# Patient Record
Sex: MALE | Race: WHITE | NOT HISPANIC OR LATINO | Employment: OTHER | URBAN - METROPOLITAN AREA
[De-identification: names, ages, dates, MRNs, and addresses within clinical notes are randomized per-mention and may not be internally consistent; named-entity substitution may affect disease eponyms.]

---

## 2023-04-18 LAB
ALANINE AMINOTRANSFERASE (SGPT) (U/L) IN SER/PLAS: 13 U/L (ref 10–52)
ALBUMIN (G/DL) IN SER/PLAS: 4 G/DL (ref 3.4–5)
ALKALINE PHOSPHATASE (U/L) IN SER/PLAS: 48 U/L (ref 33–136)
ANION GAP IN SER/PLAS: 12 MMOL/L (ref 10–20)
ASPARTATE AMINOTRANSFERASE (SGOT) (U/L) IN SER/PLAS: 17 U/L (ref 9–39)
BILIRUBIN TOTAL (MG/DL) IN SER/PLAS: 0.8 MG/DL (ref 0–1.2)
CALCIUM (MG/DL) IN SER/PLAS: 9.3 MG/DL (ref 8.6–10.3)
CARBON DIOXIDE, TOTAL (MMOL/L) IN SER/PLAS: 28 MMOL/L (ref 21–32)
CHLORIDE (MMOL/L) IN SER/PLAS: 106 MMOL/L (ref 98–107)
CHOLESTEROL (MG/DL) IN SER/PLAS: 111 MG/DL (ref 0–199)
CHOLESTEROL IN HDL (MG/DL) IN SER/PLAS: 45.3 MG/DL
CHOLESTEROL/HDL RATIO: 2.5
CREATININE (MG/DL) IN SER/PLAS: 1.12 MG/DL (ref 0.5–1.3)
ERYTHROCYTE DISTRIBUTION WIDTH (RATIO) BY AUTOMATED COUNT: 12.5 % (ref 11.5–14.5)
ERYTHROCYTE MEAN CORPUSCULAR HEMOGLOBIN CONCENTRATION (G/DL) BY AUTOMATED: 32.1 G/DL (ref 32–36)
ERYTHROCYTE MEAN CORPUSCULAR VOLUME (FL) BY AUTOMATED COUNT: 94 FL (ref 80–100)
ERYTHROCYTES (10*6/UL) IN BLOOD BY AUTOMATED COUNT: 5.11 X10E12/L (ref 4.5–5.9)
GFR MALE: 67 ML/MIN/1.73M2
GLUCOSE (MG/DL) IN SER/PLAS: 100 MG/DL (ref 74–99)
HEMATOCRIT (%) IN BLOOD BY AUTOMATED COUNT: 48 % (ref 41–52)
HEMOGLOBIN (G/DL) IN BLOOD: 15.4 G/DL (ref 13.5–17.5)
LDL: 51 MG/DL (ref 0–99)
LEUKOCYTES (10*3/UL) IN BLOOD BY AUTOMATED COUNT: 6.4 X10E9/L (ref 4.4–11.3)
MAGNESIUM (MG/DL) IN SER/PLAS: 2.33 MG/DL (ref 1.6–2.4)
PLATELETS (10*3/UL) IN BLOOD AUTOMATED COUNT: 216 X10E9/L (ref 150–450)
POTASSIUM (MMOL/L) IN SER/PLAS: 4.8 MMOL/L (ref 3.5–5.3)
PROTEIN TOTAL: 6.6 G/DL (ref 6.4–8.2)
SODIUM (MMOL/L) IN SER/PLAS: 141 MMOL/L (ref 136–145)
TRIGLYCERIDE (MG/DL) IN SER/PLAS: 76 MG/DL (ref 0–149)
UREA NITROGEN (MG/DL) IN SER/PLAS: 29 MG/DL (ref 6–23)
VLDL: 15 MG/DL (ref 0–40)

## 2023-10-30 ENCOUNTER — LAB (OUTPATIENT)
Dept: LAB | Facility: LAB | Age: 78
End: 2023-10-30
Payer: MEDICARE

## 2023-10-30 DIAGNOSIS — I10 ESSENTIAL (PRIMARY) HYPERTENSION: ICD-10-CM

## 2023-10-30 DIAGNOSIS — R00.1 BRADYCARDIA, UNSPECIFIED: ICD-10-CM

## 2023-10-30 DIAGNOSIS — D64.9 ANEMIA, UNSPECIFIED: ICD-10-CM

## 2023-10-30 DIAGNOSIS — E66.9 OBESITY, UNSPECIFIED: ICD-10-CM

## 2023-10-30 DIAGNOSIS — E78.5 HYPERLIPIDEMIA, UNSPECIFIED: ICD-10-CM

## 2023-10-30 DIAGNOSIS — R55 SYNCOPE AND COLLAPSE: ICD-10-CM

## 2023-10-30 DIAGNOSIS — I35.0 NONRHEUMATIC AORTIC (VALVE) STENOSIS: ICD-10-CM

## 2023-10-30 DIAGNOSIS — I25.10 ATHEROSCLEROTIC HEART DISEASE OF NATIVE CORONARY ARTERY WITHOUT ANGINA PECTORIS: ICD-10-CM

## 2023-10-30 DIAGNOSIS — E78.5 HYPERLIPIDEMIA, UNSPECIFIED: Primary | ICD-10-CM

## 2023-10-30 LAB
ANION GAP SERPL CALC-SCNC: 12 MMOL/L (ref 10–20)
BUN SERPL-MCNC: 30 MG/DL (ref 6–23)
CALCIUM SERPL-MCNC: 9.1 MG/DL (ref 8.6–10.3)
CHLORIDE SERPL-SCNC: 105 MMOL/L (ref 98–107)
CO2 SERPL-SCNC: 25 MMOL/L (ref 21–32)
CREAT SERPL-MCNC: 1.06 MG/DL (ref 0.5–1.3)
GFR SERPL CREATININE-BSD FRML MDRD: 71 ML/MIN/1.73M*2
GLUCOSE SERPL-MCNC: 93 MG/DL (ref 74–99)
MAGNESIUM SERPL-MCNC: 2.27 MG/DL (ref 1.6–2.4)
POTASSIUM SERPL-SCNC: 4.3 MMOL/L (ref 3.5–5.3)
SODIUM SERPL-SCNC: 138 MMOL/L (ref 136–145)

## 2023-10-30 PROCEDURE — 83735 ASSAY OF MAGNESIUM: CPT

## 2023-10-30 PROCEDURE — 36415 COLL VENOUS BLD VENIPUNCTURE: CPT

## 2023-10-30 PROCEDURE — 80048 BASIC METABOLIC PNL TOTAL CA: CPT

## 2023-11-06 DIAGNOSIS — E78.5 HYPERLIPIDEMIA, UNSPECIFIED HYPERLIPIDEMIA TYPE: Primary | ICD-10-CM

## 2023-11-08 RX ORDER — ROSUVASTATIN CALCIUM 5 MG/1
TABLET, COATED ORAL
COMMUNITY
Start: 2023-08-06 | End: 2023-11-08 | Stop reason: SDUPTHER

## 2023-11-08 RX ORDER — LISINOPRIL AND HYDROCHLOROTHIAZIDE 20; 25 MG/1; MG/1
1 TABLET ORAL DAILY
Qty: 90 TABLET | Refills: 3 | OUTPATIENT
Start: 2023-11-08

## 2023-11-09 RX ORDER — ROSUVASTATIN CALCIUM 5 MG/1
TABLET, COATED ORAL
Qty: 90 TABLET | Refills: 3 | Status: SHIPPED | OUTPATIENT
Start: 2023-11-09 | End: 2023-11-16 | Stop reason: SDUPTHER

## 2023-11-14 RX ORDER — ROSUVASTATIN CALCIUM 5 MG/1
TABLET, COATED ORAL
COMMUNITY
Start: 2022-11-18 | End: 2023-11-16 | Stop reason: WASHOUT

## 2023-11-14 RX ORDER — LISINOPRIL AND HYDROCHLOROTHIAZIDE 20; 25 MG/1; MG/1
TABLET ORAL
COMMUNITY
Start: 2023-08-06 | End: 2023-11-16 | Stop reason: SDUPTHER

## 2023-11-14 RX ORDER — VIT C/E/ZN/COPPR/LUTEIN/ZEAXAN 250MG-90MG
CAPSULE ORAL
COMMUNITY
Start: 2016-08-15

## 2023-11-14 RX ORDER — ACETAMINOPHEN, DEXTROMETHORPHAN HBR, DOXYLAMINE SUCCINATE, PHENYLEPHRINE HCL 650; 20; 12.5; 1 MG/30ML; MG/30ML; MG/30ML; MG/30ML
1 SOLUTION ORAL DAILY
COMMUNITY
Start: 2022-05-16

## 2023-11-14 RX ORDER — TIMOLOL MALEATE 5 MG/ML
SOLUTION/ DROPS OPHTHALMIC
COMMUNITY
Start: 2016-08-15

## 2023-11-14 RX ORDER — LISINOPRIL AND HYDROCHLOROTHIAZIDE 10; 12.5 MG/1; MG/1
1 TABLET ORAL DAILY
COMMUNITY
Start: 2016-08-15 | End: 2023-11-16 | Stop reason: WASHOUT

## 2023-11-14 RX ORDER — ASPIRIN 81 MG/1
1 TABLET ORAL DAILY
COMMUNITY
Start: 2022-05-16

## 2023-11-14 RX ORDER — FERROUS SULFATE 325(65) MG
1 TABLET ORAL DAILY
COMMUNITY
Start: 2022-05-16

## 2023-11-15 PROBLEM — E66.9 OBESITY: Status: ACTIVE | Noted: 2023-11-15

## 2023-11-15 PROBLEM — R60.0 LOWER EXTREMITY EDEMA: Status: ACTIVE | Noted: 2023-11-15

## 2023-11-15 PROBLEM — R55 SYNCOPE AND COLLAPSE: Status: ACTIVE | Noted: 2023-11-15

## 2023-11-15 PROBLEM — E11.9 DIABETES MELLITUS (MULTI): Status: ACTIVE | Noted: 2023-11-15

## 2023-11-15 PROBLEM — I10 HYPERTENSION: Status: ACTIVE | Noted: 2023-11-15

## 2023-11-15 PROBLEM — E78.5 HYPERLIPIDEMIA: Status: ACTIVE | Noted: 2023-11-15

## 2023-11-15 PROBLEM — R09.89 BRUIT: Status: ACTIVE | Noted: 2023-11-15

## 2023-11-15 PROBLEM — E55.9 VITAMIN D DEFICIENCY: Status: ACTIVE | Noted: 2023-11-15

## 2023-11-15 PROBLEM — I25.10 ASHD (ARTERIOSCLEROTIC HEART DISEASE): Status: ACTIVE | Noted: 2023-11-15

## 2023-11-15 PROBLEM — M79.673 CHRONIC HEEL PAIN: Status: ACTIVE | Noted: 2023-11-15

## 2023-11-15 PROBLEM — G89.29 CHRONIC HEEL PAIN: Status: ACTIVE | Noted: 2023-11-15

## 2023-11-15 PROBLEM — R01.1 SYSTOLIC MURMUR: Status: ACTIVE | Noted: 2023-11-15

## 2023-11-15 PROBLEM — R53.83 FATIGUE: Status: ACTIVE | Noted: 2023-11-15

## 2023-11-15 PROBLEM — K21.9 GERD (GASTROESOPHAGEAL REFLUX DISEASE): Status: ACTIVE | Noted: 2023-11-15

## 2023-11-15 PROBLEM — I35.0 AORTIC VALVE STENOSIS: Status: ACTIVE | Noted: 2023-11-15

## 2023-11-15 PROBLEM — U07.1 COVID-19: Status: ACTIVE | Noted: 2023-11-15

## 2023-11-15 PROBLEM — D64.9 ANEMIA: Status: ACTIVE | Noted: 2023-11-15

## 2023-11-15 PROBLEM — R00.1 BRADYCARDIA: Status: ACTIVE | Noted: 2023-11-15

## 2023-11-16 ENCOUNTER — OFFICE VISIT (OUTPATIENT)
Dept: CARDIOLOGY | Facility: CLINIC | Age: 78
End: 2023-11-16
Payer: MEDICARE

## 2023-11-16 VITALS
WEIGHT: 193 LBS | SYSTOLIC BLOOD PRESSURE: 120 MMHG | BODY MASS INDEX: 29.78 KG/M2 | DIASTOLIC BLOOD PRESSURE: 64 MMHG | HEART RATE: 60 BPM

## 2023-11-16 DIAGNOSIS — E78.5 HYPERLIPIDEMIA, UNSPECIFIED HYPERLIPIDEMIA TYPE: ICD-10-CM

## 2023-11-16 DIAGNOSIS — E78.2 MIXED HYPERLIPIDEMIA: ICD-10-CM

## 2023-11-16 DIAGNOSIS — I35.0 NONRHEUMATIC AORTIC VALVE STENOSIS: ICD-10-CM

## 2023-11-16 DIAGNOSIS — I10 PRIMARY HYPERTENSION: ICD-10-CM

## 2023-11-16 DIAGNOSIS — R00.1 BRADYCARDIA: Primary | ICD-10-CM

## 2023-11-16 DIAGNOSIS — R55 SYNCOPE AND COLLAPSE: ICD-10-CM

## 2023-11-16 DIAGNOSIS — I25.10 ASHD (ARTERIOSCLEROTIC HEART DISEASE): ICD-10-CM

## 2023-11-16 PROBLEM — R69 DISORDER: Status: ACTIVE | Noted: 2022-05-04

## 2023-11-16 PROBLEM — Z82.49 FAMILY HISTORY OF ISCHEMIC HEART DISEASE: Status: ACTIVE | Noted: 2022-04-21

## 2023-11-16 PROBLEM — U07.1 DISEASE DUE TO SEVERE ACUTE RESPIRATORY SYNDROME CORONAVIRUS 2 (SARS-COV-2): Status: ACTIVE | Noted: 2023-11-16

## 2023-11-16 PROBLEM — K21.9 GASTROESOPHAGEAL REFLUX DISEASE: Status: ACTIVE | Noted: 2022-05-04

## 2023-11-16 PROBLEM — R94.31 ABNORMAL ELECTROCARDIOGRAPHY: Status: ACTIVE | Noted: 2022-04-21

## 2023-11-16 PROBLEM — Z79.02 LONG TERM (CURRENT) USE OF ANTITHROMBOTICS/ANTIPLATELETS: Status: ACTIVE | Noted: 2022-05-04

## 2023-11-16 PROBLEM — D50.9 IRON DEFICIENCY ANEMIA: Status: ACTIVE | Noted: 2022-05-04

## 2023-11-16 PROBLEM — Z20.822 CONTACT WITH AND (SUSPECTED) EXPOSURE TO COVID-19: Status: ACTIVE | Noted: 2022-04-21

## 2023-11-16 PROBLEM — R60.0 EDEMA OF LOWER EXTREMITY: Status: ACTIVE | Noted: 2023-11-16

## 2023-11-16 PROBLEM — K57.30 DIVERTICULOSIS OF LARGE INTESTINE WITHOUT PERFORATION OR ABSCESS WITHOUT BLEEDING: Status: ACTIVE | Noted: 2022-05-04

## 2023-11-16 PROBLEM — Z79.82 LONG TERM (CURRENT) USE OF ASPIRIN: Status: ACTIVE | Noted: 2022-05-04

## 2023-11-16 PROBLEM — M79.673 HEEL PAIN: Status: ACTIVE | Noted: 2023-11-16

## 2023-11-16 PROCEDURE — 3074F SYST BP LT 130 MM HG: CPT | Performed by: PHYSICIAN ASSISTANT

## 2023-11-16 PROCEDURE — 1159F MED LIST DOCD IN RCRD: CPT | Performed by: PHYSICIAN ASSISTANT

## 2023-11-16 PROCEDURE — 99213 OFFICE O/P EST LOW 20 MIN: CPT | Performed by: PHYSICIAN ASSISTANT

## 2023-11-16 PROCEDURE — 3078F DIAST BP <80 MM HG: CPT | Performed by: PHYSICIAN ASSISTANT

## 2023-11-16 RX ORDER — ROSUVASTATIN CALCIUM 5 MG/1
TABLET, COATED ORAL
Qty: 90 TABLET | Refills: 3 | Status: SHIPPED | OUTPATIENT
Start: 2023-11-16 | End: 2024-03-07 | Stop reason: SDUPTHER

## 2023-11-16 RX ORDER — LISINOPRIL AND HYDROCHLOROTHIAZIDE 20; 25 MG/1; MG/1
1 TABLET ORAL DAILY
Qty: 90 TABLET | Refills: 3 | Status: SHIPPED | OUTPATIENT
Start: 2023-11-16 | End: 2024-03-07 | Stop reason: WASHOUT

## 2023-11-16 ASSESSMENT — ENCOUNTER SYMPTOMS
VOMITING: 0
PALPITATIONS: 0
ABDOMINAL PAIN: 0
NAUSEA: 0
DIARRHEA: 0
SHORTNESS OF BREATH: 0
WEAKNESS: 0
FEVER: 0
ORTHOPNEA: 0
WHEEZING: 0
DYSURIA: 0

## 2023-11-16 NOTE — PATIENT INSTRUCTIONS
Please continue your current medications.  If you have any change in your cardiorespiratory status please call the office right away.  We will arrange for your yearly visit with your cardiologist Dr. Gregory in May.

## 2023-11-16 NOTE — PROGRESS NOTES
Cardiology Follow Up  Chief Complaint:   6 month follow up     History Of Present Illness:    This is a 77-year-old male here for follow-up regarding bradycardia/syncope, coronary artery disease with known  of his RCA and collaterals from the left, mild aortic valve stenosis/regurgitation, hypertension, dyslipidemia, anemia, and obesity. The patient was last evaluated by me in November 2022. At that visit I started the patient on rosuvastatin 5 mg daily, ordered blood work including BMP/CBC/magnesium, ordered a CMP/lipid/magnesium/CBC to be drawn in 6 months, and asked the patient to follow-up in 6 months. CBC done in April 2023 showed a hemoglobin of 15.4, magnesium was 2.33, CMP showed normal serum sodium and potassium with a serum creatinine of 1.12. Lipid panel done in April 2023 showed an LDL cholesterol of 51 and triglycerides of 76 while on rosuvastatin 5 mg daily. ECG done today showed sinus rhythm with a heart rate of 62 bpm, and PACs. The patient reports that he has been feeling generally well from the cardiac standpoint. He denies any new chest pain, shortness of breath, palpitations and lightheadedness. He states that he tries to stay physically active. He states that he takes all of his medications as prescribed. During my exam, he was resting comfortably on the exam table.   11-16-23: This is a pleasant 78-year-old patient known to Dr. Gregory.  He presents for stable 6-month follow-up.  At last office visit blood pressures were elevated and his lisinopril hydrochlorothiazide dosing was increased.  Follow-up labs were unremarkable.  Patient has not had any syncope or falling.  No awareness of slower heart rates but he does note that if his systolic blood pressure drops below 110 he feels very fatigued.  He has kept an extensive graph of his blood pressure since the increased lisinopril hydrochlorothiazide and most of his blood pressures are hanging out right around 1 20-1 30 range systolic.  He has no  other cardiac symptoms such as chest pain or shortness of breath.  He is not having any peripheral edema and otherwise feels that he is coming along very well.  He has not  and currently works for the [employer].     Last Recorded Vitals:  Vitals:    11/16/23 0903   BP: 120/64   Pulse: 60   Weight: 87.5 kg (193 lb)       Past Medical History:  He has a past medical history of Personal history of other diseases of the digestive system, Unspecified abdominal hernia without obstruction or gangrene, and Vitamin D deficiency, unspecified.    Past Surgical History:  He has a past surgical history that includes Eye surgery (08/15/2016); Rotator cuff repair (08/15/2016); and Other surgical history (01/19/2020).      Social History:  He reports that he has never smoked. He does not have any smokeless tobacco history on file. No history on file for alcohol use and drug use.    Family History:  No family history on file.     Allergies:  Patient has no known allergies.    Outpatient Medications:  Current Outpatient Medications   Medication Instructions    aspirin 81 mg EC tablet 1 tablet, oral, Daily    cholecalciferol (Vitamin D-3) 25 MCG (1000 UT) capsule oral    cyanocobalamin, vitamin B-12, (Vitamin B-12) 1,000 mcg tablet extended release 1 tablet, oral, Daily    ferrous sulfate, 325 mg ferrous sulfate, tablet 1 tablet, oral, Daily    lisinopriL-hydrochlorothiazide 20-25 mg tablet 1 tablet, oral, Daily    rosuvastatin (Crestor) 5 mg tablet Take one tablet by mouth in the evening.    timolol (Timoptic) 0.5 % ophthalmic solution ophthalmic (eye)     Review of Systems   Constitutional: Negative for fever and malaise/fatigue.   Cardiovascular:  Negative for chest pain, orthopnea and palpitations.   Respiratory:  Negative for shortness of breath and wheezing.    Skin:  Negative for itching and rash.   Gastrointestinal:  Negative for abdominal pain, diarrhea, nausea and vomiting.    Genitourinary:  Negative for dysuria.   Neurological:  Negative for weakness.      Physical Exam  Constitutional:       General: He is not in acute distress.  HENT:      Mouth/Throat:      Mouth: Mucous membranes are moist.   Cardiovascular:      Rate and Rhythm: Normal rate and regular rhythm.      Heart sounds: Normal heart sounds. No murmur heard.  Abdominal:      General: Abdomen is flat. Bowel sounds are normal.      Palpations: Abdomen is soft.   Musculoskeletal:         General: No swelling.   Skin:     General: Skin is warm and dry.   Neurological:      Mental Status: He is alert and oriented to person, place, and time.   Psychiatric:         Mood and Affect: Mood normal.           Last Labs:  CBC -  Lab Results   Component Value Date    WBC 6.4 04/18/2023    HGB 15.4 04/18/2023    HCT 48.0 04/18/2023    MCV 94 04/18/2023     04/18/2023       CMP -  Lab Results   Component Value Date    CALCIUM 9.1 10/30/2023    PROT 6.6 04/18/2023    ALBUMIN 4.0 04/18/2023    AST 17 04/18/2023    ALT 13 04/18/2023    ALKPHOS 48 04/18/2023    BILITOT 0.8 04/18/2023       LIPID PANEL -   Lab Results   Component Value Date    CHOL 111 04/18/2023    TRIG 76 04/18/2023    HDL 45.3 04/18/2023    CHHDL 2.5 04/18/2023    LDLF 51 04/18/2023    VLDL 15 04/18/2023    NHDL 131 11/18/2022       RENAL FUNCTION PANEL -   Lab Results   Component Value Date    GLUCOSE 93 10/30/2023     10/30/2023    K 4.3 10/30/2023     10/30/2023    CO2 25 10/30/2023    ANIONGAP 12 10/30/2023    BUN 30 (H) 10/30/2023    CREATININE 1.06 10/30/2023    GFRMALE 67 04/18/2023    CALCIUM 9.1 10/30/2023    ALBUMIN 4.0 04/18/2023        Lab Results   Component Value Date    HGBA1C 6.5 (A) 01/04/2022       Last Cardiology Tests:  Echo: 4/22--CONCLUSIONS:   1. The left ventricular systolic function is normal with a 60-65% estimated ejection fraction.   2. The left atrium is moderately dilated.   3. Mild aortic valve stenosis.      Lab review: I  have personally reviewed the laboratory result(s).    Assessment/Plan   Problem List Items Addressed This Visit             ICD-10-CM       Cardiac and Vasculature    Aortic valve stenosis I35.0    ASHD (arteriosclerotic heart disease) I25.10    Bradycardia - Primary R00.1    Relevant Orders    Follow Up In Cardiology    Hypertension I10    Relevant Medications    lisinopriL-hydrochlorothiazide 20-25 mg tablet    Other Relevant Orders    Follow Up In Cardiology    Hyperlipidemia E78.5    Relevant Medications    rosuvastatin (Crestor) 5 mg tablet       Symptoms and Signs    Syncope and collapse R55   Hypertension--with the increased lisinopril hydrochlorothiazide blood pressures are well controlled.  Patient advised to continue to monitor his blood pressures and when he is monitoring his blood pressures to also record heart rates with his prior history of bradycardia..  Aortic stenosis--this was mild on last echocardiogram and will likely need a follow-up echo when he sees Dr. Gregory next time.  History of ASHD--no chest pain or angina.  Patient does quite a bit of bicycling and with normal bicycle activities he is not having any chest pain.  LV systolic function normal.  History of syncope--no recurrent events and again with his blood pressure log he is not having any significant episodes of hypotension.  History of hyperlipidemia--on statin therapy and lipids are very well controlled at this time.  Overall patient is doing very well from a cardiac standpoint.  His blood pressures are much better controlled with the lisinopril hydrochlorothiazide.  We will refill his medications and arrange for 6-month follow-up with Dr. Gregory.  Should the patient have any change in cardiorespiratory status he is instructed to contact the office right away.        Mickey Jackson PA-C  11/16/2023  9:29 AM

## 2024-03-07 ENCOUNTER — OFFICE VISIT (OUTPATIENT)
Dept: PRIMARY CARE | Facility: CLINIC | Age: 79
End: 2024-03-07
Payer: MEDICARE

## 2024-03-07 VITALS
RESPIRATION RATE: 16 BRPM | OXYGEN SATURATION: 98 % | BODY MASS INDEX: 29.98 KG/M2 | WEIGHT: 197.8 LBS | HEART RATE: 51 BPM | DIASTOLIC BLOOD PRESSURE: 74 MMHG | HEIGHT: 68 IN | SYSTOLIC BLOOD PRESSURE: 142 MMHG

## 2024-03-07 DIAGNOSIS — Z00.00 MEDICARE ANNUAL WELLNESS VISIT, SUBSEQUENT: Primary | ICD-10-CM

## 2024-03-07 DIAGNOSIS — E78.5 HYPERLIPIDEMIA, UNSPECIFIED HYPERLIPIDEMIA TYPE: ICD-10-CM

## 2024-03-07 DIAGNOSIS — I10 PRIMARY HYPERTENSION: ICD-10-CM

## 2024-03-07 DIAGNOSIS — E11.9 DIET-CONTROLLED DIABETES MELLITUS (MULTI): Chronic | ICD-10-CM

## 2024-03-07 PROBLEM — K21.9 GERD (GASTROESOPHAGEAL REFLUX DISEASE): Status: RESOLVED | Noted: 2023-11-15 | Resolved: 2024-03-07

## 2024-03-07 PROBLEM — K21.9 GASTROESOPHAGEAL REFLUX DISEASE: Status: RESOLVED | Noted: 2022-05-04 | Resolved: 2024-03-07

## 2024-03-07 PROCEDURE — 1159F MED LIST DOCD IN RCRD: CPT | Performed by: STUDENT IN AN ORGANIZED HEALTH CARE EDUCATION/TRAINING PROGRAM

## 2024-03-07 PROCEDURE — 1170F FXNL STATUS ASSESSED: CPT | Performed by: STUDENT IN AN ORGANIZED HEALTH CARE EDUCATION/TRAINING PROGRAM

## 2024-03-07 PROCEDURE — 1160F RVW MEDS BY RX/DR IN RCRD: CPT | Performed by: STUDENT IN AN ORGANIZED HEALTH CARE EDUCATION/TRAINING PROGRAM

## 2024-03-07 PROCEDURE — 3078F DIAST BP <80 MM HG: CPT | Performed by: STUDENT IN AN ORGANIZED HEALTH CARE EDUCATION/TRAINING PROGRAM

## 2024-03-07 PROCEDURE — 3077F SYST BP >= 140 MM HG: CPT | Performed by: STUDENT IN AN ORGANIZED HEALTH CARE EDUCATION/TRAINING PROGRAM

## 2024-03-07 PROCEDURE — 1158F ADVNC CARE PLAN TLK DOCD: CPT | Performed by: STUDENT IN AN ORGANIZED HEALTH CARE EDUCATION/TRAINING PROGRAM

## 2024-03-07 PROCEDURE — G0439 PPPS, SUBSEQ VISIT: HCPCS | Performed by: STUDENT IN AN ORGANIZED HEALTH CARE EDUCATION/TRAINING PROGRAM

## 2024-03-07 PROCEDURE — 1036F TOBACCO NON-USER: CPT | Performed by: STUDENT IN AN ORGANIZED HEALTH CARE EDUCATION/TRAINING PROGRAM

## 2024-03-07 PROCEDURE — 99214 OFFICE O/P EST MOD 30 MIN: CPT | Performed by: STUDENT IN AN ORGANIZED HEALTH CARE EDUCATION/TRAINING PROGRAM

## 2024-03-07 PROCEDURE — 1123F ACP DISCUSS/DSCN MKR DOCD: CPT | Performed by: STUDENT IN AN ORGANIZED HEALTH CARE EDUCATION/TRAINING PROGRAM

## 2024-03-07 RX ORDER — LISINOPRIL 10 MG/1
10 TABLET ORAL DAILY
Qty: 90 TABLET | Refills: 1 | Status: SHIPPED | OUTPATIENT
Start: 2024-03-07 | End: 2024-05-24 | Stop reason: SDUPTHER

## 2024-03-07 RX ORDER — ROSUVASTATIN CALCIUM 5 MG/1
TABLET, COATED ORAL
Qty: 90 TABLET | Refills: 3 | Status: SHIPPED | OUTPATIENT
Start: 2024-03-07 | End: 2024-05-24 | Stop reason: SDUPTHER

## 2024-03-07 RX ORDER — ROSUVASTATIN CALCIUM 5 MG/1
TABLET, COATED ORAL
Qty: 90 TABLET | Refills: 3 | Status: CANCELLED | OUTPATIENT
Start: 2024-03-07

## 2024-03-07 RX ORDER — LISINOPRIL AND HYDROCHLOROTHIAZIDE 20; 25 MG/1; MG/1
1 TABLET ORAL DAILY
Qty: 90 TABLET | Refills: 3 | Status: CANCELLED | OUTPATIENT
Start: 2024-03-07 | End: 2025-03-07

## 2024-03-07 ASSESSMENT — ENCOUNTER SYMPTOMS
OCCASIONAL FEELINGS OF UNSTEADINESS: 0
HEADACHES: 0
LOSS OF SENSATION IN FEET: 0
DEPRESSION: 0
FEVER: 0
FATIGUE: 0
COUGH: 0
CONFUSION: 0
SHORTNESS OF BREATH: 0
PALPITATIONS: 0

## 2024-03-07 ASSESSMENT — ANXIETY QUESTIONNAIRES
5. BEING SO RESTLESS THAT IT IS HARD TO SIT STILL: NOT AT ALL
2. NOT BEING ABLE TO STOP OR CONTROL WORRYING: NOT AT ALL
IF YOU CHECKED OFF ANY PROBLEMS ON THIS QUESTIONNAIRE, HOW DIFFICULT HAVE THESE PROBLEMS MADE IT FOR YOU TO DO YOUR WORK, TAKE CARE OF THINGS AT HOME, OR GET ALONG WITH OTHER PEOPLE: NOT DIFFICULT AT ALL
4. TROUBLE RELAXING: NOT AT ALL
1. FEELING NERVOUS, ANXIOUS, OR ON EDGE: NOT AT ALL
6. BECOMING EASILY ANNOYED OR IRRITABLE: NOT AT ALL
3. WORRYING TOO MUCH ABOUT DIFFERENT THINGS: NOT AT ALL
GAD7 TOTAL SCORE: 0
7. FEELING AFRAID AS IF SOMETHING AWFUL MIGHT HAPPEN: NOT AT ALL

## 2024-03-07 ASSESSMENT — PATIENT HEALTH QUESTIONNAIRE - PHQ9
2. FEELING DOWN, DEPRESSED OR HOPELESS: NOT AT ALL
SUM OF ALL RESPONSES TO PHQ9 QUESTIONS 1 AND 2: 0
1. LITTLE INTEREST OR PLEASURE IN DOING THINGS: NOT AT ALL

## 2024-03-07 ASSESSMENT — COLUMBIA-SUICIDE SEVERITY RATING SCALE - C-SSRS
1. IN THE PAST MONTH, HAVE YOU WISHED YOU WERE DEAD OR WISHED YOU COULD GO TO SLEEP AND NOT WAKE UP?: NO
2. HAVE YOU ACTUALLY HAD ANY THOUGHTS OF KILLING YOURSELF?: NO
6. HAVE YOU EVER DONE ANYTHING, STARTED TO DO ANYTHING, OR PREPARED TO DO ANYTHING TO END YOUR LIFE?: NO

## 2024-03-07 ASSESSMENT — ACTIVITIES OF DAILY LIVING (ADL)
DRESSING: INDEPENDENT
GROCERY_SHOPPING: INDEPENDENT
BATHING: INDEPENDENT
MANAGING_FINANCES: INDEPENDENT
TAKING_MEDICATION: INDEPENDENT
DOING_HOUSEWORK: INDEPENDENT

## 2024-03-07 NOTE — PROGRESS NOTES
Subjective   Reason for Visit: Deondre Valdez is an 78 y.o. male here for a Medicare Wellness visit.     Past Medical, Surgical, and Family History reviewed and updated in chart.    Reviewed all medications by prescribing practitioner or clinical pharmacist (such as prescriptions, OTCs, herbal therapies and supplements) and documented in the medical record.    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   Currently staying busy with [employer] and [employer]    Has been without medications for about 2 weeks  Brought in low, diastolic seems to be well controlled but systolic in 140-150 range     Patient Care Team:  Tenisha Byrd DO as PCP - General (Family Medicine)  Randy Berry MD as PCP - [employer] PCP  Janes Gregory DO as Consulting Physician (Cardiology)     Past Medical History:   Diagnosis Date    Personal history of other diseases of the digestive system     History of esophageal reflux    Unspecified abdominal hernia without obstruction or gangrene     Hernia    Vitamin D deficiency, unspecified     Vitamin D deficiency     Past Surgical History:   Procedure Laterality Date    EYE SURGERY  08/15/2016    Eye Surgery    OTHER SURGICAL HISTORY  01/19/2020    Appendectomy    ROTATOR CUFF REPAIR  08/15/2016    Rotator Cuff Repair     Family History   Problem Relation Name Age of Onset    Heart disease Father      Lung cancer Maternal Grandmother       Body mass index is 30.52 kg/m².    Tobacco/Alcohol/Opioid use, as well as Illicit Drug Use was screened for/reviewed and documented in Social History section and medication list as appropriate    Medications and Supplements  prescribed by me and other practitioners or clinical pharmacist (such as prescriptions, OTC's, herbal therapies and supplements) were reviewed and documented in the medical record.    Tobacco/Alcohol/Opioid use, as well as Illicit  "Drug Use was screened for/reviewed and documented in Social History section and medication list as appropriate    Review of Systems   Constitutional:  Negative for fatigue and fever.   Respiratory:  Negative for cough and shortness of breath.    Cardiovascular:  Negative for chest pain, palpitations and leg swelling.   Allergic/Immunologic: Negative for immunocompromised state.   Neurological:  Negative for headaches.   Psychiatric/Behavioral:  Negative for confusion.      Objective   Vitals:  /74   Pulse 51   Resp 16   Ht 1.715 m (5' 7.5\")   Wt 89.7 kg (197 lb 12.8 oz)   SpO2 98%   BMI 30.52 kg/m²       Physical Exam  Constitutional:       Appearance: Normal appearance.   HENT:      Head: Normocephalic and atraumatic.   Cardiovascular:      Rate and Rhythm: Normal rate and regular rhythm.      Pulses: Normal pulses.   Pulmonary:      Effort: Pulmonary effort is normal.   Musculoskeletal:      Comments: Trace bilateral ankle edema   Neurological:      General: No focal deficit present.      Mental Status: He is oriented to person, place, and time.   Psychiatric:         Mood and Affect: Mood normal.         Behavior: Behavior normal.     Assessment/Plan   Problem List Items Addressed This Visit       Hypertension    Current Assessment & Plan     Given WNL diastolic but slightly elevated systolic, I suspect adding back lisinopril hydrochlorothiazide 20-25 will be too strong, will start with lisinopril 10mg and titrate as needed  Goal BP less than 130/80  Work on maintaining a healthy weight, monitoring sodium intake, consistent activity and exercise  Avoid chronic use of NSAIDs  Do not use sudafed for decongestant when ill         Relevant Medications    lisinopril 10 mg tablet    Other Relevant Orders    CBC and Auto Differential    Diet-controlled diabetes mellitus (CMS/HCC) (Chronic)    Current Assessment & Plan     It has been some time since last A1c will update  Recommend annual eye exam  Is on Ace " and statin         Relevant Orders    Hemoglobin A1C    Comprehensive Metabolic Panel    Hyperlipidemia    Relevant Medications    rosuvastatin (Crestor) 5 mg tablet    Other Relevant Orders    Lipid Panel    Follow Up In [facility] - PCP - Established    Medicare annual wellness visit, subsequent - Primary    Current Assessment & Plan     PNEUMONIA vaccine- Declines  SHINGLES vaccine- Declines  INFLUENZA vaccine- Declines  Screening tests:  Colon cancer screening--> Not indicated  Prostate Cancer Screening--> Not indicated    During the course of the visit the patient was educated and counseled about age appropriate screening and preventive services. Completed preventive screenings were documented in the chart and orders were placed for outstanding screenings/procedures as documented in the Assessment and Plan.    Patient Instructions (the written plan) was given to the patient at check out that include any community based lifestyle interventions.    Other risk factors and conditions for which interventions are recommended are addressed as the other tagged diagnoses in this encounter. \

## 2024-03-07 NOTE — ASSESSMENT & PLAN NOTE
Given WNL diastolic but slightly elevated systolic, I suspect adding back lisinopril hydrochlorothiazide 20-25 will be too strong, will start with lisinopril 10mg and titrate as needed  Goal BP less than 130/80  Work on maintaining a healthy weight, monitoring sodium intake, consistent activity and exercise  Avoid chronic use of NSAIDs  Do not use sudafed for decongestant when ill

## 2024-03-07 NOTE — ASSESSMENT & PLAN NOTE
PNEUMONIA vaccine- Declines  SHINGLES vaccine- Declines  INFLUENZA vaccine- Declines  Screening tests:  Colon cancer screening--> Not indicated  Prostate Cancer Screening--> Not indicated    During the course of the visit the patient was educated and counseled about age appropriate screening and preventive services. Completed preventive screenings were documented in the chart and orders were placed for outstanding screenings/procedures as documented in the Assessment and Plan.    Patient Instructions (the written plan) was given to the patient at check out that include any community based lifestyle interventions.    Other risk factors and conditions for which interventions are recommended are addressed as the other tagged diagnoses in this encounter. \

## 2024-03-08 ENCOUNTER — LAB (OUTPATIENT)
Dept: LAB | Facility: LAB | Age: 79
End: 2024-03-08
Payer: MEDICARE

## 2024-03-08 ENCOUNTER — TELEPHONE (OUTPATIENT)
Dept: PRIMARY CARE | Facility: CLINIC | Age: 79
End: 2024-03-08

## 2024-03-08 DIAGNOSIS — E78.5 HYPERLIPIDEMIA, UNSPECIFIED HYPERLIPIDEMIA TYPE: ICD-10-CM

## 2024-03-08 DIAGNOSIS — E11.9 DIET-CONTROLLED DIABETES MELLITUS (MULTI): Chronic | ICD-10-CM

## 2024-03-08 DIAGNOSIS — I10 PRIMARY HYPERTENSION: ICD-10-CM

## 2024-03-08 LAB
ALBUMIN SERPL BCP-MCNC: 4.1 G/DL (ref 3.4–5)
ALP SERPL-CCNC: 48 U/L (ref 33–136)
ALT SERPL W P-5'-P-CCNC: 13 U/L (ref 10–52)
ANION GAP SERPL CALC-SCNC: 10 MMOL/L (ref 10–20)
AST SERPL W P-5'-P-CCNC: 18 U/L (ref 9–39)
BASOPHILS # BLD AUTO: 0.03 X10*3/UL (ref 0–0.1)
BASOPHILS NFR BLD AUTO: 0.5 %
BILIRUB SERPL-MCNC: 0.8 MG/DL (ref 0–1.2)
BUN SERPL-MCNC: 26 MG/DL (ref 6–23)
CALCIUM SERPL-MCNC: 9 MG/DL (ref 8.6–10.3)
CHLORIDE SERPL-SCNC: 109 MMOL/L (ref 98–107)
CHOLEST SERPL-MCNC: 171 MG/DL (ref 0–199)
CHOLESTEROL/HDL RATIO: 3.7
CO2 SERPL-SCNC: 25 MMOL/L (ref 21–32)
CREAT SERPL-MCNC: 1.06 MG/DL (ref 0.5–1.3)
EGFRCR SERPLBLD CKD-EPI 2021: 71 ML/MIN/1.73M*2
EOSINOPHIL # BLD AUTO: 0.26 X10*3/UL (ref 0–0.4)
EOSINOPHIL NFR BLD AUTO: 4.4 %
ERYTHROCYTE [DISTWIDTH] IN BLOOD BY AUTOMATED COUNT: 13.2 % (ref 11.5–14.5)
EST. AVERAGE GLUCOSE BLD GHB EST-MCNC: 148 MG/DL
GLUCOSE SERPL-MCNC: 91 MG/DL (ref 74–99)
HBA1C MFR BLD: 6.8 %
HCT VFR BLD AUTO: 49.3 % (ref 41–52)
HDLC SERPL-MCNC: 45.7 MG/DL
HGB BLD-MCNC: 16 G/DL (ref 13.5–17.5)
IMM GRANULOCYTES # BLD AUTO: 0.02 X10*3/UL (ref 0–0.5)
IMM GRANULOCYTES NFR BLD AUTO: 0.3 % (ref 0–0.9)
LDLC SERPL CALC-MCNC: 106 MG/DL
LYMPHOCYTES # BLD AUTO: 1.56 X10*3/UL (ref 0.8–3)
LYMPHOCYTES NFR BLD AUTO: 26.4 %
MCH RBC QN AUTO: 30.2 PG (ref 26–34)
MCHC RBC AUTO-ENTMCNC: 32.5 G/DL (ref 32–36)
MCV RBC AUTO: 93 FL (ref 80–100)
MONOCYTES # BLD AUTO: 0.67 X10*3/UL (ref 0.05–0.8)
MONOCYTES NFR BLD AUTO: 11.3 %
NEUTROPHILS # BLD AUTO: 3.38 X10*3/UL (ref 1.6–5.5)
NEUTROPHILS NFR BLD AUTO: 57.1 %
NON HDL CHOLESTEROL: 125 MG/DL (ref 0–149)
NRBC BLD-RTO: 0 /100 WBCS (ref 0–0)
PLATELET # BLD AUTO: 205 X10*3/UL (ref 150–450)
POTASSIUM SERPL-SCNC: 4.4 MMOL/L (ref 3.5–5.3)
PROT SERPL-MCNC: 6.4 G/DL (ref 6.4–8.2)
RBC # BLD AUTO: 5.29 X10*6/UL (ref 4.5–5.9)
SODIUM SERPL-SCNC: 140 MMOL/L (ref 136–145)
TRIGL SERPL-MCNC: 96 MG/DL (ref 0–149)
VLDL: 19 MG/DL (ref 0–40)
WBC # BLD AUTO: 5.9 X10*3/UL (ref 4.4–11.3)

## 2024-03-08 PROCEDURE — 36415 COLL VENOUS BLD VENIPUNCTURE: CPT

## 2024-03-08 PROCEDURE — 80053 COMPREHEN METABOLIC PANEL: CPT

## 2024-03-08 PROCEDURE — 85025 COMPLETE CBC W/AUTO DIFF WBC: CPT

## 2024-03-08 PROCEDURE — 80061 LIPID PANEL: CPT

## 2024-03-08 PROCEDURE — 83036 HEMOGLOBIN GLYCOSYLATED A1C: CPT

## 2024-03-08 NOTE — TELEPHONE ENCOUNTER
----- Message from Tenisha Byrd DO sent at 3/8/2024 11:12 AM EST -----  Lab work confirms diabetic status at ha1c of 6.8 this is progressed from last labs. I would say we can continue diet control but if A1c rises above 7 then we will need to add medication on board to help control.   His cholesterol is also elevated from last visit significantly- particularly his bad cholesterol, we are restarting his statin.    Recommend he work on increasing fiber rich foods, lean proteins like fish and poultry. Decreases complex fats like oils, butters, added sugars, red meat.     
I spoke with patient he is aware   
Clothing

## 2024-05-20 NOTE — PROGRESS NOTES
[facility]    Patient Name: Deondre Valdez  Patient : 1945      Scribe Attestation  By signing my name below, ICeci Scribe   attest that this documentation has been prepared under the direction and in the presence of Janes Gregory DO.      Reason for visit:  This is a 78-year-old male here for follow-up regarding coronary artery disease with known  of his RCA and collaterals from the left as seen on cardiac catheterization done in , mild aortic valve stenosis/regurgitation, syncope/bradycardia, hypertension, dyslipidemia, and obesity.     HPI:  This is a 78-year-old male here for follow-up regarding coronary artery disease with known  of his RCA and collaterals from the left as seen on cardiac catheterization done in , mild aortic valve stenosis/regurgitation, syncope/bradycardia, hypertension, dyslipidemia, and obesity.  The patient was last evaluated by me in May 2023.  At that visit I increased lisinopril/hydrochlorothiazide to 20/25 mg daily, ordered blood work including BMP/magnesium to be drawn in 6 months, and asked the patient to follow-up in 6 months and sooner if necessary.  The patient was subsequently seen by the cardiology PA in 2023 at which time he was doing reasonably well.  CMP done in 2024 showed normal serum sodium and potassium with a serum creatinine of 1.06, normal ALT/AST, hemoglobin A1c of 6.8%, CBC showing a hemoglobin of 16.0.  Lipid panel done in 2024 showed an LDL cholesterol 106 and triglycerides of 96 while reportedly on rosuvastatin 5 mg daily. ECG done today showed sinus bradycardia with a heart rate of 56 bpm.  The patient reports that he has been feeling generally well from the cardiac standpoint. He denies any new chest pain, shortness of breath, palpitations and lightheadedness. He brought with him his blood monitor sheet which showed elevated blood pressure readings in March and  April. He states that his blood pressure in May looked better as he was standing while his blood pressure was being taken. He reports that he has not been taking lisinopril and lisinopril/hydrochlorothiazide. He also reports that he had not been taking rosuvastatin. He states that he takes his other medications as prescribed.  During my exam, he was resting comfortably on the exam table.            Assessment/Plan:   1. Coronary artery disease  The patient has a history of coronary artery disease with known  of his RCA and collaterals from the left as seen on cardiac catheterization done in 2006.  ECG done today showed sinus bradycardia with a heart rate of 56 bpm.    He denies anginal chest discomfort.  Blood pressure appears suboptimally controlled on exam today.   He reports having elevated blood pressure readings at home. He states that his blood pressure in May appeared better as he was standing while his blood pressure was being taken.   I will restart him on lisinopril 5 mg daily.  He should continue low dose aspirin.  Echocardiogram done in April 2022 showed normal left ventricular systolic function with an ejection fraction of 60 to 65%, normal right ventricular systolic function, mild aortic valve stenosis with a mean pressure gradient of 12 mmHg and a dimensionless index of 0.53, trace to mild aortic valve regurgitation.  Recent lab works as noted in the HPI.   Lipid panel done in March 2024 showed an LDL cholesterol 106 and triglycerides of 96 while not on any lipid lowering medication.  I will restart him on rosuvastatin 5 mg daily.   Lab works as noted below will be done in 6 months prior to his next visit.  Please see lifestyle recommendations below.  Follow up in 6 months and sooner if necessary.     2. Aortic valve stenosis/regurgitation  The patient has a history of mild aortic valve stenosis/regurgitation.  Echocardiogram done in April 2022 showed normal left ventricular systolic function with  an ejection fraction of 60 to 65%, normal right ventricular systolic function, mild aortic valve stenosis with a mean pressure gradient of 12 mmHg and a dimensionless index of 0.53, trace to mild aortic valve regurgitation.  I will continue to monitor this clinically for now.    3. Bradycardia/Syncope  The patient has a history of syncope/bradycardia.  He denies having recurrence of syncopal episodes since his last visit.  ECG done today showed sinus bradycardia with a heart rate of 56 bpm.    He denies chest pain, palpitations or lightheadedness.   Echocardiogram done in April 2022 showed normal left ventricular systolic function with an ejection fraction of 60 to 65%, normal right ventricular systolic function, mild aortic valve stenosis with a mean pressure gradient of 12 mmHg and a dimensionless index of 0.53, trace to mild aortic valve regurgitation.  Recent lab works as noted in the HPI.  Lab works as noted below will be done in 6 months prior to his next visit.   Patient should follow general recommendations including staying well-hydrated, changing the positions slowly, wearing compression stockings as necessary, and routine exercise.  Follow up in 6 months and sooner if necessary.      4. Hypertension  The patient has a history of hypertension which appears suboptimally controlled on exam today.  He reports having elevated blood pressure readings at home. He states that his blood pressure in May appeared better as he was standing while his blood pressure was being taken.   I will restart him on lisinopril 5 mg daily.  He should continue his other antihypertensive medications and monitor his blood pressure at home. He should be in a sitting position when blood pressure is being taken.  He should call the clinic if his blood pressure is persistently elevated.     5. Dyslipidemia  Lipid panel done in March 2024 showed an LDL cholesterol 106 and triglycerides of 96 while not on any lipid lowering medication.  I  will restart him on rosuvastatin 5 mg daily.   Lipid panel will be updated in 6 months.  Please see lifestyle recommendations below.     6. Obesity   Please see lifestyle recommendations below.        Orders:   Restart lisinopril 5 mg daily.  Restart rosuvastatin 5 mg daily.  CMP/lipid/magnesium/CBC in 6 months,   Follow-up in 6 months.    Lifestyle Recommendations  I recommend a whole-food plant-based diet, an eating pattern that encourages the consumption of unrefined plant foods (such as fruits, vegetables, tubers, whole grains, legumes, nuts and seeds) and discourages meats, dairy products, eggs and processed foods.     The AHA/ACC recommends that the patient consume a dietary pattern that emphasizes intake of vegetables, fruits, and whole grains; includes low-fat dairy products, poultry, fish, legumes, non-tropical vegetable oils, and nuts; and limits intake of sodium, sweets, sugar-sweetened beverages, and red meats.  Adapt this dietary pattern to appropriate calorie requirements (a 500-750 kcal/day deficit to loose weight), personal and cultural food preferences, and nutrition therapy for other medical conditions (including diabetes).  Achieve this pattern by following plans such as the [place] Mediterranean, DASH dietary pattern, or AHA diet.     Engage in 2 hours and 30 minutes per week of moderate-intensity physical activity, or 1 hour and 15 minutes (75 minutes) per week of vigorous-intensity aerobic physical activity, or an equivalent combination of moderate and vigorous-intensity aerobic physical activity. Aerobic activity should be performed in episodes of at least 10 minutes preferably spread throughout the week.     Adhering to a heart healthy diet, regular exercise habits, avoidance of tobacco products, and maintenance of a healthy weight are crucial components of their heart disease risk reduction.     Any positive review of systems not specifically addressed in the office visit today should be  evaluated and treated by the patients primary care physician or in an emergency department if necessary     Patient was notified that results from ordered tests will be called to the patient if it changes current management; it will otherwise be discussed at a future appointment and available on [facility].     Thank you for allowing me to participate in the care of this patient.        This document was generated using the assistance of voice recognition software. If there are any errors of spelling, grammar, syntax, or meaning; please feel free to contact me directly for clarification.    Past Medical History:  He has a past medical history of Personal history of other diseases of the digestive system, Unspecified abdominal hernia without obstruction or gangrene, and Vitamin D deficiency, unspecified.    Past Surgical History:  He has a past surgical history that includes Eye surgery (08/15/2016); Rotator cuff repair (08/15/2016); and Other surgical history (01/19/2020).      Social History:  He reports that he has never smoked. He has never used smokeless tobacco. He reports that he does not drink alcohol and does not use drugs.    Family History:  Family History   Problem Relation Name Age of Onset    Heart disease Father      Lung cancer Maternal Grandmother          Allergies:  Patient has no known allergies.    Outpatient Medications:  Current Outpatient Medications   Medication Instructions    aspirin 81 mg EC tablet 1 tablet, oral, Daily    cholecalciferol (Vitamin D-3) 25 MCG (1000 UT) capsule oral    cyanocobalamin, vitamin B-12, (Vitamin B-12) 1,000 mcg tablet extended release 1 tablet, oral, Daily    ferrous sulfate, 325 mg ferrous sulfate, tablet 1 tablet, oral, Daily    lisinopril 10 mg, oral, Daily    rosuvastatin (Crestor) 5 mg tablet Take one tablet by mouth in the evening.    timolol (Timoptic) 0.5 % ophthalmic solution ophthalmic (eye)        ROS:  A 14 point review of systems was done and is  "negative other than as stated in HPI    Vitals:      4/19/2022    11:39 AM 5/4/2022    10:26 AM 5/26/2022    12:37 PM 11/18/2022     1:58 PM 5/26/2023     3:26 PM 11/16/2023     9:03 AM 3/7/2024     2:01 PM   Vitals   Systolic   170 138 150 120 142   Diastolic   74 68 74 64 74   Heart Rate   68 61  60 51   Resp    17   16   Height (in) 1.701 m (5' 6.97\") 1.701 m (5' 6.97\") 1.715 m (5' 7.5\") 1.715 m (5' 7.5\")   1.715 m (5' 7.5\")   Weight (lb) 187.39 197.31 192.5 197 194.8 193 197.8   BMI 29.38 kg/m2 30.93 kg/m2 29.7 kg/m2 30.4 kg/m2 30.06 kg/m2 29.78 kg/m2 30.52 kg/m2   BSA (m2) 2 m2 2.06 m2 2.04 m2 2.06 m2 2.05 m2 2.04 m2 2.07 m2        Physical Exam:   Constitutional: Cooperative, in no acute distress, alert, appears stated age.   Skin: Skin color, texture, turgor normal. No rashes or lesions.   Head: Normocephalic. No masses, lesions, tenderness or abnormalities   Eyes: Extraocular movements are grossly intact.   Mouth and throat: Mucous membranes moist   Neck: Neck supple, no carotid bruits, no JVD   Respiratory: Lungs clear to auscultation, no wheezing or rhonchi, no use of accessory muscles   Chest wall: No scars, normal excursion with respiration   Cardiovascular: Bradycardic, regular rhythm, +murmur  Gastrointestinal: Abdomen soft, nontender. Bowel sounds normal. Obese.  Musculoskeletal: Strength equal in upper extremities   Extremities: Trace pitting edema   Neurologic: Sensation grossly intact, alert and oriented x3    Intake/Output:   No intake/output data recorded.    Outpatient Medications  Current Outpatient Medications on File Prior to Visit   Medication Sig Dispense Refill    aspirin 81 mg EC tablet Take 1 tablet (81 mg) by mouth once daily.      cholecalciferol (Vitamin D-3) 25 MCG (1000 UT) capsule Take by mouth.      cyanocobalamin, vitamin B-12, (Vitamin B-12) 1,000 mcg tablet extended release Take 1 tablet (1,000 mcg) by mouth once daily.      ferrous sulfate, 325 mg ferrous sulfate, tablet Take " 1 tablet by mouth once daily.      lisinopril 10 mg tablet Take 1 tablet (10 mg) by mouth once daily. 90 tablet 1    rosuvastatin (Crestor) 5 mg tablet Take one tablet by mouth in the evening. 90 tablet 3    timolol (Timoptic) 0.5 % ophthalmic solution Administer into affected eye(s).       No current facility-administered medications on file prior to visit.       Labs: (past 26 weeks)  Recent Results (from the past 4368 hour(s))   Hemoglobin A1C    Collection Time: 03/08/24  9:57 AM   Result Value Ref Range    Hemoglobin A1C 6.8 (H) see below %    Estimated Average Glucose 148 Not Established mg/dL   Lipid Panel    Collection Time: 03/08/24  9:57 AM   Result Value Ref Range    Cholesterol 171 0 - 199 mg/dL    HDL-Cholesterol 45.7 mg/dL    Cholesterol/HDL Ratio 3.7     LDL Calculated 106 (H) <=99 mg/dL    VLDL 19 0 - 40 mg/dL    Triglycerides 96 0 - 149 mg/dL    Non HDL Cholesterol 125 0 - 149 mg/dL   Comprehensive Metabolic Panel    Collection Time: 03/08/24  9:57 AM   Result Value Ref Range    Glucose 91 74 - 99 mg/dL    Sodium 140 136 - 145 mmol/L    Potassium 4.4 3.5 - 5.3 mmol/L    Chloride 109 (H) 98 - 107 mmol/L    Bicarbonate 25 21 - 32 mmol/L    Anion Gap 10 10 - 20 mmol/L    Urea Nitrogen 26 (H) 6 - 23 mg/dL    Creatinine 1.06 0.50 - 1.30 mg/dL    eGFR 71 >60 mL/min/1.73m*2    Calcium 9.0 8.6 - 10.3 mg/dL    Albumin 4.1 3.4 - 5.0 g/dL    Alkaline Phosphatase 48 33 - 136 U/L    Total Protein 6.4 6.4 - 8.2 g/dL    AST 18 9 - 39 U/L    Bilirubin, Total 0.8 0.0 - 1.2 mg/dL    ALT 13 10 - 52 U/L   CBC and Auto Differential    Collection Time: 03/08/24  9:57 AM   Result Value Ref Range    WBC 5.9 4.4 - 11.3 x10*3/uL    nRBC 0.0 0.0 - 0.0 /100 WBCs    RBC 5.29 4.50 - 5.90 x10*6/uL    Hemoglobin 16.0 13.5 - 17.5 g/dL    Hematocrit 49.3 41.0 - 52.0 %    MCV 93 80 - 100 fL    MCH 30.2 26.0 - 34.0 pg    MCHC 32.5 32.0 - 36.0 g/dL    RDW 13.2 11.5 - 14.5 %    Platelets 205 150 - 450 x10*3/uL    Neutrophils % 57.1 40.0  - 80.0 %    Immature Granulocytes %, Automated 0.3 0.0 - 0.9 %    Lymphocytes % 26.4 13.0 - 44.0 %    Monocytes % 11.3 2.0 - 10.0 %    Eosinophils % 4.4 0.0 - 6.0 %    Basophils % 0.5 0.0 - 2.0 %    Neutrophils Absolute 3.38 1.60 - 5.50 x10*3/uL    Immature Granulocytes Absolute, Automated 0.02 0.00 - 0.50 x10*3/uL    Lymphocytes Absolute 1.56 0.80 - 3.00 x10*3/uL    Monocytes Absolute 0.67 0.05 - 0.80 x10*3/uL    Eosinophils Absolute 0.26 0.00 - 0.40 x10*3/uL    Basophils Absolute 0.03 0.00 - 0.10 x10*3/uL       ECG  No results found for this or any previous visit (from the past 4464 hour(s)).    Echocardiogram  No results found for this or any previous visit from the past 1095 days.      CV Studies:  EKG:No results found for this or any previous visit (from the past 4464 hour(s)).  Echocardiogram:   [place]ocardi[place] [address]  Phone [phone] Fax [phone]    TRANSTHORACIC ECHOCARDIOGRAM REPORT      Patient Name:     ROGELIO Villeda Physician:  [id number] Piper Randle MD  Study Date:       4/21/2022          Referring           [id number] MÓNICA GRAYSON  Physician:  MRN/PID:          [id number]           PCP:  Accession/Order#: [id number]          Department  Location:  YOB: 1945          Fellow:  Gender:           M                  Nurse:  Admit Date:       4/19/2022          Sonographer:        Diana Byrne [facility]  Admission Status: Inpatient -        Additional Staff:  Routine  Height:           170.18 cm          CC Report to:  Weight:           84.82 kg           Study Type:         Echocardiogram  BSA:              1.97 m2  Blood Pressure: 106 /55 mmHg    Diagnosis/ICD: R94.31-Abnormal electrocardiogram [ECG] [EKG]  Indication:    Abnormal EKG  Procedure/CPT: Echo Complete w Full Doppler-[id number]  Study Detail: The following Echo studies were performed: 2D, M-Mode, Doppler and  color flow.      PHYSICIAN  INTERPRETATION:  Left Ventricle: The left ventricular systolic function is normal, with an estimated ejection fraction of 60-65%. There are no regional wall motion abnormalities. The left ventricular cavity size is normal. Spectral Doppler shows a normal pattern of left ventricular diastolic filling.  Left Atrium: The left atrium is moderately dilated.  Right Ventricle: The right ventricle is normal in size. There is normal right ventricular global systolic function.  Right Atrium: The right atrium is normal in size.  Aortic Valve: The aortic valve is trileaflet. There is evidence of mild aortic valve stenosis.  The aortic valve dimensionless index is 0.53. There is trace to mild aortic valve regurgitation. The peak instantaneous gradient of the aortic valve is 23.6 mmHg. The mean gradient of the aortic valve is 12.0 mmHg.  Mitral Valve: The mitral valve is normal in structure. There is no evidence of mitral valve regurgitation.  Tricuspid Valve: The tricuspid valve is structurally normal. No evidence of tricuspid regurgitation.  Pulmonic Valve: The pulmonic valve is structurally normal. There is physiologic pulmonic valve regurgitation.  Pericardium: There is no pericardial effusion noted.  Aorta: The aortic root is normal.      CONCLUSIONS:  1. The left ventricular systolic function is normal with a 60-65% estimated ejection fraction.  2. The left atrium is moderately dilated.  3. Mild aortic valve stenosis.    QUANTITATIVE DATA SUMMARY:  2D MEASUREMENTS:  Normal Ranges:  LAs:           4.60 cm   (2.7-4.0cm)  IVSd:          1.10 cm   (0.6-1.1cm)  LVPWd:         1.20 cm   (0.6-1.1cm)  LVIDd:         4.45 cm   (3.9-5.9cm)  LVIDs:         2.47 cm  LV Mass Index: 96.5 g/m2  LV % FS        44.5 %    LA VOLUME:  Normal Ranges:  LA Vol A4C:        51.7 ml    (22+/-6mL/m2)  LA Vol A2C:        58.3 ml  LA Vol BP:         59.8 ml  LA Vol Index A4C:  26.3ml/m2  LA Vol Index A2C:  29.7 ml/m2  LA Vol Index BP:   30.4 ml/m2  LA  Area A4C:       20.0 cm2  LA Area A2C:       19.5 cm2  LA Major Axis A4C: 6.6 cm  LA Major Axis A2C: 5.5 cm  LA Volume Index:   29.0 ml/m2    RA VOLUME BY A/L METHOD:  Normal Ranges:  RA Area A4C: 13.3 cm2    M-MODE MEASUREMENTS:  Normal Ranges:  Ao Root: 2.66 cm (2.0-3.7cm)    LV SYSTOLIC FUNCTION BY 2D PLANIMETRY (MOD):  Normal Ranges:  EF-A4C View: 65.4 % (>55%)  EF-A2C View: 60.2 %  EF-Biplane:  65.1 %    LV DIASTOLIC FUNCTION:  Normal Ranges:  MV Peak E:    0.77 m/s (0.7-1.2 m/s)  MV Peak A:    1.07 m/s (0.42-0.7 m/s)  E/A Ratio:    0.72     (1.0-2.2)  MV e'         0.10 m/s (>8.0)  MV lateral e' 0.12 m/s  MV medial e'  0.09 m/s  E/e' Ratio:   7.34     (<8.0)    MITRAL VALVE:  Normal Ranges:  MV DT: 246 msec (150-240msec)    AORTIC VALVE:  Normal Ranges:  AoV Vmax:                2.43 m/s  (<1.7m/s)  AoV Peak P.6 mmHg (<20mmHg)  AoV Mean P.0 mmHg (1.7-11.5mmHg)  LVOT Max Missael:            1.12 m/s  (<1.1m/s)  AoV VTI:                 49.30 cm  (18-25cm)  LVOT VTI:                26.00 cm  AoV Dimensionless Index: 0.53    RIGHT VENTRICLE:  RV 1   3.43 cm  RV 2   3.34 cm  RV 3   7.04 cm  TAPSE: 31.6 mm  RV s'  0.22 m/s    TRICUSPID VALVE/RVSP:  Normal Ranges:  TV E Vmax: 0.42 m/s (0.3-0.7m/s)  TV A Vmax: 0.43 m/s  IVC Diam:  1.63 cm      [id number] Piper Randle MD  Electronically signed on 2022 at 6:04:32 PM         Final     Stress Testing BJORN([id number]:1:): No results found for this or any previous visit from the past  days.    Cardiac Catheterization: No results found for this or any previous visit from the past  days.  No results found for this or any previous visit from the past 3650 days.     Cardiac Scoring: No results found for this or any previous visit from the past  days.    AAA : No results found for this or any previous visit from the past  days.    OTHER: No results found for this or any previous visit from the past  days.    LAST  IMAGING RESULTS  ELECTROCARDIOGRAM RHYTHM STRIP  Ordered by an unspecified provider.    Problem List Items Addressed This Visit       Aortic valve stenosis    ASHD (arteriosclerotic heart disease) - Primary    Bradycardia    Hypertension    Hyperlipidemia    Obesity (BMI 30-39.9)    Syncope and collapse         Janes Gregory DO, FACC, FACOI

## 2024-05-24 ENCOUNTER — OFFICE VISIT (OUTPATIENT)
Dept: CARDIOLOGY | Facility: CLINIC | Age: 79
End: 2024-05-24
Payer: MEDICARE

## 2024-05-24 VITALS
BODY MASS INDEX: 28.19 KG/M2 | HEIGHT: 68 IN | DIASTOLIC BLOOD PRESSURE: 88 MMHG | HEART RATE: 56 BPM | SYSTOLIC BLOOD PRESSURE: 160 MMHG | WEIGHT: 186 LBS

## 2024-05-24 DIAGNOSIS — R00.1 BRADYCARDIA: ICD-10-CM

## 2024-05-24 DIAGNOSIS — I25.10 ASHD (ARTERIOSCLEROTIC HEART DISEASE): Primary | ICD-10-CM

## 2024-05-24 DIAGNOSIS — R55 SYNCOPE AND COLLAPSE: ICD-10-CM

## 2024-05-24 DIAGNOSIS — I35.0 NONRHEUMATIC AORTIC VALVE STENOSIS: ICD-10-CM

## 2024-05-24 DIAGNOSIS — E78.5 HYPERLIPIDEMIA, UNSPECIFIED HYPERLIPIDEMIA TYPE: ICD-10-CM

## 2024-05-24 DIAGNOSIS — E78.00 PURE HYPERCHOLESTEROLEMIA: ICD-10-CM

## 2024-05-24 DIAGNOSIS — I10 PRIMARY HYPERTENSION: ICD-10-CM

## 2024-05-24 DIAGNOSIS — E66.9 OBESITY (BMI 30-39.9): ICD-10-CM

## 2024-05-24 PROCEDURE — 99214 OFFICE O/P EST MOD 30 MIN: CPT | Performed by: INTERNAL MEDICINE

## 2024-05-24 PROCEDURE — 1159F MED LIST DOCD IN RCRD: CPT | Performed by: INTERNAL MEDICINE

## 2024-05-24 PROCEDURE — 1036F TOBACCO NON-USER: CPT | Performed by: INTERNAL MEDICINE

## 2024-05-24 PROCEDURE — 1160F RVW MEDS BY RX/DR IN RCRD: CPT | Performed by: INTERNAL MEDICINE

## 2024-05-24 PROCEDURE — 3077F SYST BP >= 140 MM HG: CPT | Performed by: INTERNAL MEDICINE

## 2024-05-24 PROCEDURE — 1123F ACP DISCUSS/DSCN MKR DOCD: CPT | Performed by: INTERNAL MEDICINE

## 2024-05-24 PROCEDURE — 3079F DIAST BP 80-89 MM HG: CPT | Performed by: INTERNAL MEDICINE

## 2024-05-24 PROCEDURE — 93000 ELECTROCARDIOGRAM COMPLETE: CPT | Performed by: INTERNAL MEDICINE

## 2024-05-24 RX ORDER — LISINOPRIL 5 MG/1
5 TABLET ORAL DAILY
Qty: 90 TABLET | Refills: 3 | Status: SHIPPED | OUTPATIENT
Start: 2024-05-24 | End: 2024-06-04 | Stop reason: SDUPTHER

## 2024-05-24 RX ORDER — ROSUVASTATIN CALCIUM 5 MG/1
TABLET, COATED ORAL
Qty: 90 TABLET | Refills: 3 | Status: SHIPPED | OUTPATIENT
Start: 2024-05-24 | End: 2024-06-04 | Stop reason: SDUPTHER

## 2024-06-04 DIAGNOSIS — E78.5 HYPERLIPIDEMIA, UNSPECIFIED HYPERLIPIDEMIA TYPE: ICD-10-CM

## 2024-06-04 DIAGNOSIS — I10 PRIMARY HYPERTENSION: ICD-10-CM

## 2024-06-05 RX ORDER — LISINOPRIL 5 MG/1
5 TABLET ORAL DAILY
Qty: 90 TABLET | Refills: 3 | Status: SHIPPED | OUTPATIENT
Start: 2024-06-05 | End: 2025-05-31

## 2024-06-05 RX ORDER — ROSUVASTATIN CALCIUM 5 MG/1
TABLET, COATED ORAL
Qty: 90 TABLET | Refills: 3 | Status: SHIPPED | OUTPATIENT
Start: 2024-06-05

## 2024-09-13 ENCOUNTER — TELEPHONE (OUTPATIENT)
Dept: PRIMARY CARE | Facility: CLINIC | Age: 79
End: 2024-09-13

## 2024-09-13 ENCOUNTER — APPOINTMENT (OUTPATIENT)
Dept: PRIMARY CARE | Facility: CLINIC | Age: 79
End: 2024-09-13
Payer: MEDICARE

## 2024-09-13 VITALS
BODY MASS INDEX: 26.51 KG/M2 | DIASTOLIC BLOOD PRESSURE: 86 MMHG | HEART RATE: 77 BPM | OXYGEN SATURATION: 98 % | WEIGHT: 179 LBS | HEIGHT: 69 IN | SYSTOLIC BLOOD PRESSURE: 140 MMHG

## 2024-09-13 DIAGNOSIS — I10 PRIMARY HYPERTENSION: Primary | ICD-10-CM

## 2024-09-13 DIAGNOSIS — E11.9 DIET-CONTROLLED DIABETES MELLITUS (MULTI): ICD-10-CM

## 2024-09-13 DIAGNOSIS — E78.5 HYPERLIPIDEMIA, UNSPECIFIED HYPERLIPIDEMIA TYPE: ICD-10-CM

## 2024-09-13 LAB — POC HEMOGLOBIN A1C: 6.4 % (ref 4.2–6.5)

## 2024-09-13 PROCEDURE — 83036 HEMOGLOBIN GLYCOSYLATED A1C: CPT | Performed by: STUDENT IN AN ORGANIZED HEALTH CARE EDUCATION/TRAINING PROGRAM

## 2024-09-13 PROCEDURE — 1159F MED LIST DOCD IN RCRD: CPT | Performed by: STUDENT IN AN ORGANIZED HEALTH CARE EDUCATION/TRAINING PROGRAM

## 2024-09-13 PROCEDURE — 1123F ACP DISCUSS/DSCN MKR DOCD: CPT | Performed by: STUDENT IN AN ORGANIZED HEALTH CARE EDUCATION/TRAINING PROGRAM

## 2024-09-13 PROCEDURE — 1160F RVW MEDS BY RX/DR IN RCRD: CPT | Performed by: STUDENT IN AN ORGANIZED HEALTH CARE EDUCATION/TRAINING PROGRAM

## 2024-09-13 PROCEDURE — 3079F DIAST BP 80-89 MM HG: CPT | Performed by: STUDENT IN AN ORGANIZED HEALTH CARE EDUCATION/TRAINING PROGRAM

## 2024-09-13 PROCEDURE — 1036F TOBACCO NON-USER: CPT | Performed by: STUDENT IN AN ORGANIZED HEALTH CARE EDUCATION/TRAINING PROGRAM

## 2024-09-13 PROCEDURE — 99214 OFFICE O/P EST MOD 30 MIN: CPT | Performed by: STUDENT IN AN ORGANIZED HEALTH CARE EDUCATION/TRAINING PROGRAM

## 2024-09-13 PROCEDURE — 3077F SYST BP >= 140 MM HG: CPT | Performed by: STUDENT IN AN ORGANIZED HEALTH CARE EDUCATION/TRAINING PROGRAM

## 2024-09-13 RX ORDER — LISINOPRIL 5 MG/1
10 TABLET ORAL DAILY
Start: 2024-09-13 | End: 2025-09-08

## 2024-09-13 RX ORDER — LISINOPRIL 5 MG/1
10 TABLET ORAL DAILY
Status: SHIPPED
Start: 2024-09-13 | End: 2024-09-13

## 2024-09-13 ASSESSMENT — ENCOUNTER SYMPTOMS
FATIGUE: 0
DECREASED CONCENTRATION: 0
DEPRESSION: 0
CONFUSION: 0
SHORTNESS OF BREATH: 0
PALPITATIONS: 0
OCCASIONAL FEELINGS OF UNSTEADINESS: 0
LOSS OF SENSATION IN FEET: 0
ACTIVITY CHANGE: 0
FEVER: 0

## 2024-09-13 ASSESSMENT — COLUMBIA-SUICIDE SEVERITY RATING SCALE - C-SSRS
1. IN THE PAST MONTH, HAVE YOU WISHED YOU WERE DEAD OR WISHED YOU COULD GO TO SLEEP AND NOT WAKE UP?: NO
6. HAVE YOU EVER DONE ANYTHING, STARTED TO DO ANYTHING, OR PREPARED TO DO ANYTHING TO END YOUR LIFE?: NO
2. HAVE YOU ACTUALLY HAD ANY THOUGHTS OF KILLING YOURSELF?: NO

## 2024-09-13 ASSESSMENT — PATIENT HEALTH QUESTIONNAIRE - PHQ9
1. LITTLE INTEREST OR PLEASURE IN DOING THINGS: NOT AT ALL
2. FEELING DOWN, DEPRESSED OR HOPELESS: NOT AT ALL
SUM OF ALL RESPONSES TO PHQ9 QUESTIONS 1 AND 2: 0

## 2024-09-13 NOTE — PROGRESS NOTES
"Patient Name:  Deondre Valdez  MRN:  [id number]  :  1945    Subjective   Patient ID: Deondre Valdez is a 79 y.o. male who presents for Follow-up (Pt here to follow up).    HPI     78 yo male presents for follow up  Due for A1c as well     Has graph of BP  Has been running high but feeling ok     Review of Systems   Constitutional:  Negative for activity change, fatigue and fever.   Respiratory:  Negative for shortness of breath.    Cardiovascular:  Negative for chest pain and palpitations.   Allergic/Immunologic: Negative for immunocompromised state.   Psychiatric/Behavioral:  Negative for confusion and decreased concentration.      Objective   /86 (BP Location: Left arm, Patient Position: Sitting)   Pulse 77   Ht 1.74 m (5' 8.5\")   Wt 81.2 kg (179 lb)   SpO2 98%   BMI 26.82 kg/m²     Physical Exam  Cardiovascular:      Rate and Rhythm: Normal rate and regular rhythm.      Heart sounds: Murmur heard.   Pulmonary:      Effort: Pulmonary effort is normal. No respiratory distress.      Breath sounds: No wheezing.   Musculoskeletal:      Right lower leg: No edema.      Left lower leg: No edema.   Neurological:      Mental Status: He is oriented to person, place, and time.   Psychiatric:         Mood and Affect: Mood normal.         Behavior: Behavior normal.     Assessment/Plan   Problem List Items Addressed This Visit             ICD-10-CM    Hypertension - Primary (Chronic) I10     Goal BP less than 130/80  Continue lisinopril will increase to 10mg   Work on maintaining a healthy weight, monitoring sodium intake, consistent activity and exercise  Avoid chronic use of NSAIDs  Do not use sudafed/pseudoephedrine for decongestant when ill           Relevant Medications    lisinopril 5 mg tablet    Other Relevant Orders    Follow Up In [facility] - PCP - Established    Diet-controlled diabetes mellitus (Multi) (Chronic) E11.9     A1c in office-->6.4  Continue diet control  Annual eye exam " recommended  Ace and Statin         Relevant Orders    POCT glycosylated hemoglobin (Hb A1C) manually resulted (Completed)    Hyperlipidemia (Chronic) E78.5     Due for lipid with next labs, in chart already

## 2024-09-13 NOTE — ASSESSMENT & PLAN NOTE
Goal BP less than 130/80  Continue lisinopril will increase to 10mg   Work on maintaining a healthy weight, monitoring sodium intake, consistent activity and exercise  Avoid chronic use of NSAIDs  Do not use sudafed/pseudoephedrine for decongestant when ill

## 2024-10-18 ENCOUNTER — TELEPHONE (OUTPATIENT)
Dept: PRIMARY CARE | Facility: CLINIC | Age: 79
End: 2024-10-18
Payer: MEDICARE

## 2024-10-18 DIAGNOSIS — I10 PRIMARY HYPERTENSION: ICD-10-CM

## 2024-10-18 RX ORDER — LISINOPRIL 10 MG/1
10 TABLET ORAL DAILY
Qty: 100 TABLET | Refills: 1 | Status: SHIPPED | OUTPATIENT
Start: 2024-10-18 | End: 2025-11-22

## 2024-10-18 RX ORDER — LISINOPRIL 5 MG/1
10 TABLET ORAL DAILY
Qty: 60 TABLET | Refills: 11 | Status: CANCELLED | OUTPATIENT
Start: 2024-10-18 | End: 2025-10-13

## 2024-10-18 NOTE — TELEPHONE ENCOUNTER
Refill on Lisinopril 5 mg  2 tablets by mouth once daily  90 day supply # 180    To [facility]

## 2024-11-13 PROBLEM — I35.1 AORTIC REGURGITATION: Status: ACTIVE | Noted: 2024-11-13

## 2024-11-18 ENCOUNTER — APPOINTMENT (OUTPATIENT)
Dept: CARDIOLOGY | Facility: CLINIC | Age: 79
End: 2024-11-18
Payer: MEDICARE

## 2024-11-18 VITALS
BODY MASS INDEX: 26.51 KG/M2 | HEIGHT: 69 IN | SYSTOLIC BLOOD PRESSURE: 144 MMHG | DIASTOLIC BLOOD PRESSURE: 68 MMHG | WEIGHT: 179 LBS | HEART RATE: 65 BPM

## 2024-11-18 DIAGNOSIS — E78.5 HYPERLIPIDEMIA, UNSPECIFIED HYPERLIPIDEMIA TYPE: ICD-10-CM

## 2024-11-18 DIAGNOSIS — I35.0 NONRHEUMATIC AORTIC VALVE STENOSIS: ICD-10-CM

## 2024-11-18 DIAGNOSIS — I35.1 NONRHEUMATIC AORTIC VALVE INSUFFICIENCY: ICD-10-CM

## 2024-11-18 DIAGNOSIS — R00.1 BRADYCARDIA: ICD-10-CM

## 2024-11-18 DIAGNOSIS — I10 PRIMARY HYPERTENSION: Chronic | ICD-10-CM

## 2024-11-18 DIAGNOSIS — R55 SYNCOPE AND COLLAPSE: ICD-10-CM

## 2024-11-18 DIAGNOSIS — E66.9 OBESITY (BMI 30-39.9): ICD-10-CM

## 2024-11-18 DIAGNOSIS — E78.00 PURE HYPERCHOLESTEROLEMIA: Chronic | ICD-10-CM

## 2024-11-18 DIAGNOSIS — I25.10 ASHD (ARTERIOSCLEROTIC HEART DISEASE): Primary | ICD-10-CM

## 2024-11-18 PROCEDURE — 99214 OFFICE O/P EST MOD 30 MIN: CPT | Performed by: INTERNAL MEDICINE

## 2024-11-18 PROCEDURE — 93010 ELECTROCARDIOGRAM REPORT: CPT | Performed by: INTERNAL MEDICINE

## 2024-11-18 PROCEDURE — 1159F MED LIST DOCD IN RCRD: CPT | Performed by: INTERNAL MEDICINE

## 2024-11-18 PROCEDURE — 93005 ELECTROCARDIOGRAM TRACING: CPT | Performed by: INTERNAL MEDICINE

## 2024-11-18 PROCEDURE — 3077F SYST BP >= 140 MM HG: CPT | Performed by: INTERNAL MEDICINE

## 2024-11-18 PROCEDURE — 1123F ACP DISCUSS/DSCN MKR DOCD: CPT | Performed by: INTERNAL MEDICINE

## 2024-11-18 PROCEDURE — 3078F DIAST BP <80 MM HG: CPT | Performed by: INTERNAL MEDICINE

## 2024-11-18 PROCEDURE — 1036F TOBACCO NON-USER: CPT | Performed by: INTERNAL MEDICINE

## 2024-11-18 RX ORDER — LISINOPRIL 10 MG/1
10 TABLET ORAL 2 TIMES DAILY
Qty: 180 TABLET | Refills: 1 | Status: SHIPPED | OUTPATIENT
Start: 2024-11-18 | End: 2025-05-17

## 2024-11-19 ENCOUNTER — LAB (OUTPATIENT)
Dept: LAB | Facility: LAB | Age: 79
End: 2024-11-19
Payer: MEDICARE

## 2024-11-19 DIAGNOSIS — I10 PRIMARY HYPERTENSION: ICD-10-CM

## 2024-11-19 DIAGNOSIS — E78.00 PURE HYPERCHOLESTEROLEMIA: ICD-10-CM

## 2024-11-19 DIAGNOSIS — I35.0 NONRHEUMATIC AORTIC VALVE STENOSIS: ICD-10-CM

## 2024-11-19 DIAGNOSIS — R55 SYNCOPE AND COLLAPSE: ICD-10-CM

## 2024-11-19 DIAGNOSIS — R00.1 BRADYCARDIA: ICD-10-CM

## 2024-11-19 DIAGNOSIS — E66.9 OBESITY (BMI 30-39.9): ICD-10-CM

## 2024-11-19 DIAGNOSIS — E78.5 HYPERLIPIDEMIA, UNSPECIFIED HYPERLIPIDEMIA TYPE: ICD-10-CM

## 2024-11-19 DIAGNOSIS — I35.1 NONRHEUMATIC AORTIC VALVE INSUFFICIENCY: ICD-10-CM

## 2024-11-19 DIAGNOSIS — I25.10 ASHD (ARTERIOSCLEROTIC HEART DISEASE): ICD-10-CM

## 2024-11-19 LAB
ALBUMIN SERPL BCP-MCNC: 3.7 G/DL (ref 3.4–5)
ALP SERPL-CCNC: 61 U/L (ref 33–136)
ALT SERPL W P-5'-P-CCNC: 9 U/L (ref 10–52)
ANION GAP SERPL CALC-SCNC: 11 MMOL/L (ref 10–20)
AST SERPL W P-5'-P-CCNC: 14 U/L (ref 9–39)
BILIRUB SERPL-MCNC: 0.8 MG/DL (ref 0–1.2)
BUN SERPL-MCNC: 28 MG/DL (ref 6–23)
CALCIUM SERPL-MCNC: 8.8 MG/DL (ref 8.6–10.3)
CHLORIDE SERPL-SCNC: 108 MMOL/L (ref 98–107)
CHOLEST SERPL-MCNC: 107 MG/DL (ref 0–199)
CHOLESTEROL/HDL RATIO: 2.4
CO2 SERPL-SCNC: 24 MMOL/L (ref 21–32)
CREAT SERPL-MCNC: 0.97 MG/DL (ref 0.5–1.3)
EGFRCR SERPLBLD CKD-EPI 2021: 79 ML/MIN/1.73M*2
ERYTHROCYTE [DISTWIDTH] IN BLOOD BY AUTOMATED COUNT: 13.4 % (ref 11.5–14.5)
GLUCOSE SERPL-MCNC: 84 MG/DL (ref 74–99)
HCT VFR BLD AUTO: 46.4 % (ref 41–52)
HDLC SERPL-MCNC: 44.2 MG/DL
HGB BLD-MCNC: 15.1 G/DL (ref 13.5–17.5)
LDLC SERPL CALC-MCNC: 49 MG/DL
MAGNESIUM SERPL-MCNC: 2.17 MG/DL (ref 1.6–2.4)
MCH RBC QN AUTO: 29.6 PG (ref 26–34)
MCHC RBC AUTO-ENTMCNC: 32.5 G/DL (ref 32–36)
MCV RBC AUTO: 91 FL (ref 80–100)
NON HDL CHOLESTEROL: 63 MG/DL (ref 0–149)
NRBC BLD-RTO: 0 /100 WBCS (ref 0–0)
PLATELET # BLD AUTO: 214 X10*3/UL (ref 150–450)
POTASSIUM SERPL-SCNC: 4.3 MMOL/L (ref 3.5–5.3)
PROT SERPL-MCNC: 6.1 G/DL (ref 6.4–8.2)
RBC # BLD AUTO: 5.1 X10*6/UL (ref 4.5–5.9)
SODIUM SERPL-SCNC: 139 MMOL/L (ref 136–145)
TRIGL SERPL-MCNC: 67 MG/DL (ref 0–149)
VLDL: 13 MG/DL (ref 0–40)
WBC # BLD AUTO: 6.1 X10*3/UL (ref 4.4–11.3)

## 2024-11-19 PROCEDURE — 83735 ASSAY OF MAGNESIUM: CPT

## 2024-11-19 PROCEDURE — 36415 COLL VENOUS BLD VENIPUNCTURE: CPT

## 2024-11-19 PROCEDURE — 85027 COMPLETE CBC AUTOMATED: CPT

## 2024-11-19 PROCEDURE — 80053 COMPREHEN METABOLIC PANEL: CPT

## 2024-11-19 PROCEDURE — 80061 LIPID PANEL: CPT

## 2024-12-06 ENCOUNTER — TELEPHONE (OUTPATIENT)
Dept: PRIMARY CARE | Facility: CLINIC | Age: 79
End: 2024-12-06
Payer: MEDICARE

## 2024-12-06 DIAGNOSIS — E78.5 HYPERLIPIDEMIA, UNSPECIFIED HYPERLIPIDEMIA TYPE: ICD-10-CM

## 2024-12-06 RX ORDER — ROSUVASTATIN CALCIUM 5 MG/1
TABLET, COATED ORAL
Qty: 90 TABLET | Refills: 3 | Status: SHIPPED | OUTPATIENT
Start: 2024-12-06

## 2024-12-06 NOTE — TELEPHONE ENCOUNTER
Med Refill   rosuvastatin (Crestor) 5 mg tablet [[id number]]     [facility] - JOANN Hinkle - [facility] AT Portal to [facility]  [facility] [place], [place] [address]  Phone: [phone]  Fax: [phone]  VI #: --     Patient is down to his last dose

## 2024-12-09 ENCOUNTER — TELEPHONE (OUTPATIENT)
Dept: PRIMARY CARE | Facility: CLINIC | Age: 79
End: 2024-12-09
Payer: MEDICARE

## 2024-12-09 DIAGNOSIS — E78.5 HYPERLIPIDEMIA, UNSPECIFIED HYPERLIPIDEMIA TYPE: ICD-10-CM

## 2024-12-09 RX ORDER — ROSUVASTATIN CALCIUM 5 MG/1
TABLET, COATED ORAL
Qty: 90 TABLET | Refills: 3 | Status: SHIPPED | OUTPATIENT
Start: 2024-12-09

## 2025-02-20 ENCOUNTER — APPOINTMENT (OUTPATIENT)
Dept: PRIMARY CARE | Facility: CLINIC | Age: 80
End: 2025-02-20
Payer: MEDICARE

## 2025-02-20 VITALS
BODY MASS INDEX: 29.66 KG/M2 | OXYGEN SATURATION: 95 % | SYSTOLIC BLOOD PRESSURE: 139 MMHG | HEART RATE: 51 BPM | WEIGHT: 189 LBS | HEIGHT: 67 IN | DIASTOLIC BLOOD PRESSURE: 79 MMHG

## 2025-02-20 DIAGNOSIS — E11.69 DM TYPE 2 WITH DIABETIC DYSLIPIDEMIA (MULTI): Chronic | ICD-10-CM

## 2025-02-20 DIAGNOSIS — E78.5 DM TYPE 2 WITH DIABETIC DYSLIPIDEMIA (MULTI): Chronic | ICD-10-CM

## 2025-02-20 DIAGNOSIS — I10 PRIMARY HYPERTENSION: Chronic | ICD-10-CM

## 2025-02-20 DIAGNOSIS — E11.9 DIET-CONTROLLED DIABETES MELLITUS (MULTI): Chronic | ICD-10-CM

## 2025-02-20 DIAGNOSIS — Z00.00 MEDICARE ANNUAL WELLNESS VISIT, SUBSEQUENT: Primary | ICD-10-CM

## 2025-02-20 PROCEDURE — G0439 PPPS, SUBSEQ VISIT: HCPCS | Performed by: STUDENT IN AN ORGANIZED HEALTH CARE EDUCATION/TRAINING PROGRAM

## 2025-02-20 PROCEDURE — 3075F SYST BP GE 130 - 139MM HG: CPT | Performed by: STUDENT IN AN ORGANIZED HEALTH CARE EDUCATION/TRAINING PROGRAM

## 2025-02-20 PROCEDURE — 1036F TOBACCO NON-USER: CPT | Performed by: STUDENT IN AN ORGANIZED HEALTH CARE EDUCATION/TRAINING PROGRAM

## 2025-02-20 PROCEDURE — 1160F RVW MEDS BY RX/DR IN RCRD: CPT | Performed by: STUDENT IN AN ORGANIZED HEALTH CARE EDUCATION/TRAINING PROGRAM

## 2025-02-20 PROCEDURE — 99397 PER PM REEVAL EST PAT 65+ YR: CPT | Performed by: STUDENT IN AN ORGANIZED HEALTH CARE EDUCATION/TRAINING PROGRAM

## 2025-02-20 PROCEDURE — 1170F FXNL STATUS ASSESSED: CPT | Performed by: STUDENT IN AN ORGANIZED HEALTH CARE EDUCATION/TRAINING PROGRAM

## 2025-02-20 PROCEDURE — 1159F MED LIST DOCD IN RCRD: CPT | Performed by: STUDENT IN AN ORGANIZED HEALTH CARE EDUCATION/TRAINING PROGRAM

## 2025-02-20 PROCEDURE — 1123F ACP DISCUSS/DSCN MKR DOCD: CPT | Performed by: STUDENT IN AN ORGANIZED HEALTH CARE EDUCATION/TRAINING PROGRAM

## 2025-02-20 PROCEDURE — 99214 OFFICE O/P EST MOD 30 MIN: CPT | Performed by: STUDENT IN AN ORGANIZED HEALTH CARE EDUCATION/TRAINING PROGRAM

## 2025-02-20 PROCEDURE — 1158F ADVNC CARE PLAN TLK DOCD: CPT | Performed by: STUDENT IN AN ORGANIZED HEALTH CARE EDUCATION/TRAINING PROGRAM

## 2025-02-20 PROCEDURE — 3078F DIAST BP <80 MM HG: CPT | Performed by: STUDENT IN AN ORGANIZED HEALTH CARE EDUCATION/TRAINING PROGRAM

## 2025-02-20 ASSESSMENT — ENCOUNTER SYMPTOMS
OCCASIONAL FEELINGS OF UNSTEADINESS: 0
ARTHRALGIAS: 0
DEPRESSION: 0
PALPITATIONS: 0
COUGH: 0
NERVOUS/ANXIOUS: 0
CONFUSION: 0
COLOR CHANGE: 0
DIZZINESS: 0
LOSS OF SENSATION IN FEET: 0
SHORTNESS OF BREATH: 0
ACTIVITY CHANGE: 0
SPEECH DIFFICULTY: 0
DYSPHORIC MOOD: 0
FATIGUE: 0
FEVER: 0

## 2025-02-20 ASSESSMENT — PATIENT HEALTH QUESTIONNAIRE - PHQ9
2. FEELING DOWN, DEPRESSED OR HOPELESS: NOT AT ALL
1. LITTLE INTEREST OR PLEASURE IN DOING THINGS: NOT AT ALL
SUM OF ALL RESPONSES TO PHQ9 QUESTIONS 1 AND 2: 0

## 2025-02-20 ASSESSMENT — ACTIVITIES OF DAILY LIVING (ADL)
DOING_HOUSEWORK: INDEPENDENT
GROCERY_SHOPPING: INDEPENDENT
MANAGING_FINANCES: INDEPENDENT
TAKING_MEDICATION: INDEPENDENT
DRESSING: INDEPENDENT
BATHING: INDEPENDENT

## 2025-02-20 NOTE — ASSESSMENT & PLAN NOTE
Goal BP less than 130/80  Continue lisinopril 10mg BID  Work on maintaining a healthy weight, monitoring sodium intake, consistent activity and exercise  Avoid chronic use of NSAIDs  Do not use sudafed/pseudoephedrine for decongestant when ill    Orders:    Follow Up In [facility] - PCP - Established

## 2025-02-20 NOTE — ASSESSMENT & PLAN NOTE
Recommend annual eye exam  Continue to work on lifestyle modifications  Update labs   Orders:    Hemoglobin A1C; Future    Albumin-Creatinine Ratio, Urine Random; Future    CBC and Auto Differential; Future    Comprehensive Metabolic Panel; Future

## 2025-02-20 NOTE — PROGRESS NOTES
Subjective   Reason for Visit: Deondre Valdez is an 79 y.o. male here for a Medicare Wellness visit.     Past Medical, Surgical, and Family History reviewed and updated in chart.    Reviewed all medications by prescribing practitioner or clinical pharmacist (such as prescriptions, OTCs, herbal therapies and supplements) and documented in the medical record.    HPI    78 yo male presents for follow up and medicare wellness visit   No concerns or questions today    Home log of BP all in normal range  Endorsing white coat hypertension     Patient Care Team:  Tenisha Byrd DO as PCP - General (Family Medicine)  Tenisha Byrd DO as PCP - [employer] PCP  Janes Gregory DO as Consulting Physician (Cardiology)     Past Medical History:   Diagnosis Date    Personal history of other diseases of the digestive system     History of esophageal reflux    Unspecified abdominal hernia without obstruction or gangrene     Hernia    Vitamin D deficiency, unspecified     Vitamin D deficiency     Past Surgical History:   Procedure Laterality Date    EYE SURGERY  08/15/2016    Eye Surgery    OTHER SURGICAL HISTORY  01/19/2020    Appendectomy    ROTATOR CUFF REPAIR  08/15/2016    Rotator Cuff Repair     Family History   Problem Relation Name Age of Onset    Heart disease Father      Lung cancer Maternal Grandmother       Body mass index is 29.6 kg/m².    Tobacco/Alcohol/Opioid use, as well as Illicit Drug Use was screened for/reviewed and documented in Social History section and medication list as appropriate    Medications and Supplements  prescribed by me and other practitioners or clinical pharmacist (such as prescriptions, OTC's, herbal therapies and supplements) were reviewed and documented in the medical record.    Tobacco/Alcohol/Opioid use, as well as Illicit Drug Use was screened for/reviewed and documented in Social History section and medication list as appropriate    Review of Systems   Constitutional:   "Negative for activity change, fatigue and fever.   Eyes:  Negative for visual disturbance.   Respiratory:  Negative for cough and shortness of breath.    Cardiovascular:  Negative for chest pain, palpitations and leg swelling.   Musculoskeletal:  Negative for arthralgias and gait problem.   Skin:  Negative for color change.   Allergic/Immunologic: Negative for immunocompromised state.   Neurological:  Negative for dizziness and speech difficulty.   Psychiatric/Behavioral:  Negative for confusion and dysphoric mood. The patient is not nervous/anxious.      Objective   Vitals:  /79 (BP Location: Left arm, Patient Position: Sitting)   Pulse 51   Ht 1.702 m (5' 7\")   Wt 85.7 kg (189 lb)   SpO2 95%   BMI 29.60 kg/m²       Physical Exam  Constitutional:       General: He is not in acute distress.     Appearance: Normal appearance. He is not ill-appearing.   HENT:      Head: Normocephalic and atraumatic.      Right Ear: Hearing normal.      Left Ear: Hearing normal.      Mouth/Throat:      Pharynx: No oropharyngeal exudate or posterior oropharyngeal erythema.   Eyes:      Extraocular Movements: Extraocular movements intact.   Cardiovascular:      Rate and Rhythm: Normal rate and regular rhythm.      Heart sounds: Murmur heard.   Pulmonary:      Effort: Pulmonary effort is normal. No respiratory distress.   Abdominal:      Tenderness: There is no abdominal tenderness.   Musculoskeletal:      Cervical back: No tenderness.      Right lower leg: No edema.      Left lower leg: No edema.   Skin:     General: Skin is warm and dry.   Neurological:      General: No focal deficit present.      Mental Status: He is alert and oriented to person, place, and time.   Psychiatric:         Mood and Affect: Mood normal.         Behavior: Behavior normal.       Assessment & Plan  Medicare annual wellness visit, subsequent  PNEUMONIA vaccine- Declines   SHINGLES vaccine- Declines   INFLUENZA vaccine- Declines    Screening " tests:  Colon cancer screening--> out of age range for recommendation   Prostate Cancer Screening--> No concerns or FH    During the course of the visit the patient was educated and counseled about age appropriate screening and preventive services. Completed preventive screenings were documented in the chart and orders were placed for outstanding screenings/procedures as documented in the Assessment and Plan.    Patient Instructions (the written plan) was given to the patient at check out that include any community based lifestyle interventions.    Other risk factors and conditions for which interventions are recommended are addressed as the other tagged diagnoses in this encounter.   Orders:    Follow Up In [facility] - PCP - Established; Future    Diet-controlled diabetes mellitus (Multi)  Recommend annual eye exam  Continue to work on lifestyle modifications  Update labs   Orders:    Hemoglobin A1C; Future    Albumin-Creatinine Ratio, Urine Random; Future    CBC and Auto Differential; Future    Comprehensive Metabolic Panel; Future    Primary hypertension  Goal BP less than 130/80  Continue lisinopril 10mg BID  Work on maintaining a healthy weight, monitoring sodium intake, consistent activity and exercise  Avoid chronic use of NSAIDs  Do not use sudafed/pseudoephedrine for decongestant when ill    Orders:    Follow Up In [facility] - PCP - Established    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

## 2025-02-20 NOTE — ASSESSMENT & PLAN NOTE
PNEUMONIA vaccine- Declines   SHINGLES vaccine- Declines   INFLUENZA vaccine- Declines    Screening tests:  Colon cancer screening--> out of age range for recommendation   Prostate Cancer Screening--> No concerns or FH    During the course of the visit the patient was educated and counseled about age appropriate screening and preventive services. Completed preventive screenings were documented in the chart and orders were placed for outstanding screenings/procedures as documented in the Assessment and Plan.    Patient Instructions (the written plan) was given to the patient at check out that include any community based lifestyle interventions.    Other risk factors and conditions for which interventions are recommended are addressed as the other tagged diagnoses in this encounter.   Orders:    Follow Up In [facility] - PCP - Established; Future

## 2025-02-21 LAB
ALBUMIN SERPL-MCNC: 4.2 G/DL (ref 3.6–5.1)
ALP SERPL-CCNC: 56 U/L (ref 35–144)
ALT SERPL-CCNC: 12 U/L (ref 9–46)
ANION GAP SERPL CALCULATED.4IONS-SCNC: 10 MMOL/L (CALC) (ref 7–17)
AST SERPL-CCNC: 16 U/L (ref 10–35)
BASOPHILS # BLD AUTO: 73 CELLS/UL (ref 0–200)
BASOPHILS NFR BLD AUTO: 1.2 %
BILIRUB SERPL-MCNC: 0.8 MG/DL (ref 0.2–1.2)
BUN SERPL-MCNC: 26 MG/DL (ref 7–25)
CALCIUM SERPL-MCNC: 9.2 MG/DL (ref 8.6–10.3)
CHLORIDE SERPL-SCNC: 105 MMOL/L (ref 98–110)
CO2 SERPL-SCNC: 25 MMOL/L (ref 20–32)
CREAT SERPL-MCNC: 0.99 MG/DL (ref 0.7–1.22)
EGFRCR SERPLBLD CKD-EPI 2021: 77 ML/MIN/1.73M2
EOSINOPHIL # BLD AUTO: 183 CELLS/UL (ref 15–500)
EOSINOPHIL NFR BLD AUTO: 3 %
ERYTHROCYTE [DISTWIDTH] IN BLOOD BY AUTOMATED COUNT: 12.6 % (ref 11–15)
EST. AVERAGE GLUCOSE BLD GHB EST-MCNC: 137 MG/DL
EST. AVERAGE GLUCOSE BLD GHB EST-SCNC: 7.6 MMOL/L
GLUCOSE SERPL-MCNC: 92 MG/DL (ref 65–99)
HBA1C MFR BLD: 6.4 % OF TOTAL HGB
HCT VFR BLD AUTO: 48.4 % (ref 38.5–50)
HGB BLD-MCNC: 15.9 G/DL (ref 13.2–17.1)
LYMPHOCYTES # BLD AUTO: 1586 CELLS/UL (ref 850–3900)
LYMPHOCYTES NFR BLD AUTO: 26 %
MCH RBC QN AUTO: 29.8 PG (ref 27–33)
MCHC RBC AUTO-ENTMCNC: 32.9 G/DL (ref 32–36)
MCV RBC AUTO: 90.8 FL (ref 80–100)
MONOCYTES # BLD AUTO: 641 CELLS/UL (ref 200–950)
MONOCYTES NFR BLD AUTO: 10.5 %
NEUTROPHILS # BLD AUTO: 3617 CELLS/UL (ref 1500–7800)
NEUTROPHILS NFR BLD AUTO: 59.3 %
PLATELET # BLD AUTO: 198 THOUSAND/UL (ref 140–400)
PMV BLD REES-ECKER: 10.8 FL (ref 7.5–12.5)
POTASSIUM SERPL-SCNC: 5.1 MMOL/L (ref 3.5–5.3)
PROT SERPL-MCNC: 6.6 G/DL (ref 6.1–8.1)
RBC # BLD AUTO: 5.33 MILLION/UL (ref 4.2–5.8)
SODIUM SERPL-SCNC: 140 MMOL/L (ref 135–146)
WBC # BLD AUTO: 6.1 THOUSAND/UL (ref 3.8–10.8)

## 2025-03-07 ENCOUNTER — TELEPHONE (OUTPATIENT)
Dept: PRIMARY CARE | Facility: CLINIC | Age: 80
End: 2025-03-07
Payer: MEDICARE

## 2025-03-07 ENCOUNTER — TELEPHONE (OUTPATIENT)
Dept: CARDIOLOGY | Facility: HOSPITAL | Age: 80
End: 2025-03-07
Payer: MEDICARE

## 2025-03-07 NOTE — TELEPHONE ENCOUNTER
Patient left voicemail requesting a refill for his lisinopril.    Patient was called back and informed that his lisinopril 10 mg tablet is managed by Janes Gregory DO and need to call the cardiology office to request a medication refill.

## 2025-03-08 NOTE — TELEPHONE ENCOUNTER
Pt called in and LVM asking for a refill of his lisinopril 10 mg tablet @[facility] - [facility] H[facility] - [facility] AT Portal to Registered [facility] Sites.    Thank you!  Jazmín MOTT

## 2025-03-10 DIAGNOSIS — I10 PRIMARY HYPERTENSION: Chronic | ICD-10-CM

## 2025-03-10 RX ORDER — LISINOPRIL 10 MG/1
10 TABLET ORAL 2 TIMES DAILY
Qty: 180 TABLET | Refills: 1 | Status: SHIPPED | OUTPATIENT
Start: 2025-03-10 | End: 2025-09-06

## 2025-03-14 DIAGNOSIS — E78.5 HYPERLIPIDEMIA, UNSPECIFIED HYPERLIPIDEMIA TYPE: ICD-10-CM

## 2025-03-14 RX ORDER — ROSUVASTATIN CALCIUM 5 MG/1
TABLET, COATED ORAL
Qty: 90 TABLET | Refills: 3 | Status: SHIPPED | OUTPATIENT
Start: 2025-03-14

## 2025-04-27 NOTE — PROGRESS NOTES
[facility] Heart and Vascular Cardiology    Patient Name: Deondre Valdez  Patient : 1945    Scribe Attestation  By signing my name below, Ceci BROWNING, Justinaibviviana attest that this documentation has been prepared under the direction and in the presence of Janes Gregory DO.    Physician Attestation  Janes BROWNING DO, personally performed the services described in the documentation as scribed by Ceci Stephenson in my presence, and confirm it is both accurate and complete.    Reason for visit:  This is an 79-year-old male here for follow-up regarding coronary artery disease with known  of his RCA mild aortic valve stenosis, history of syncope/bradycardia, hypertension, and dyslipidemia.     HPI:  This is an 79-year-old male here for follow-up regarding coronary artery disease with known  of his RCA, mild aortic valve stenosis, history of syncope/bradycardia, hypertension, and dyslipidemia.  The patient was last evaluated by me in 2024.  At that visit I increased lisinopril to 10 mg twice daily, ordered blood work including CMP/lipid/magnesium/CBC, ordered additional blood work including BMP/magnesium be drawn in 6 months, and asked the patient to follow-up in 6 months and sooner if necessary.  BMP done in May 2025 showed normal serum sodium and potassium with a serum creatinine of 1.09. Serum magnesium was 2.8.  Hemoglobin A1c done in 2025 was 6.4%, CBC done in 2025 showed a hemoglobin of 15.9.  Lipid panel done in 2024 showed an LDL cholesterol 49 and triglycerides of 67 while on rosuvastatin 5 mg daily. ECG done today showed sinus bradycardia with a heart rate of 51 bpm.  The patient reports that he has been feeling generally well from the cardiac standpoint. He denies any new chest pain, shortness of breath, palpitations and lightheadedness. He brought with him his home monitoring sheet which showed that his usual blood pressure and heart rate had  been controlled. He also states that he had been trying to stay physically active. He states that he takes all of his medications as prescribed. During my exam, he was resting comfortably on the exam table.            Assessment/Plan:   1. Coronary artery disease  The patient has a history of coronary artery disease with known  of his RCA and collaterals from the left as seen on cardiac catheterization done in 2006.  ECG done today showed sinus bradycardia with a heart rate of 51 bpm.    He denies anginal chest discomfort.  Blood pressure appears controlled on exam today.   He should continue his current antihypertensive medications and antiplatelet therapy.  Echocardiogram done in April 2022 showed normal left ventricular systolic function with an ejection fraction of 60 to 65%, normal right ventricular systolic function, mild aortic valve stenosis with a mean pressure gradient of 12 mmHg and a dimensionless index of 0.53, trace to mild aortic valve regurgitation.  Recent lab works as noted in the HPI.  Lipid panel done in November 2024 showed an LDL cholesterol 49 and triglycerides of 67 while on rosuvastatin 5 mg daily.   Lab works as noted below will be done in 6 months prior to his next visit.   Please see lifestyle recommendations below.  Follow up in 6 months and sooner if necessary.      2. Aortic valve stenosis/regurgitation  The patient has a history of mild aortic valve stenosis/regurgitation.  Echocardiogram done in April 2022 showed normal left ventricular systolic function with an ejection fraction of 60 to 65%, normal right ventricular systolic function, mild aortic valve stenosis with a mean pressure gradient of 12 mmHg and a dimensionless index of 0.53, trace to mild aortic valve regurgitation.  I will continue to monitor this clinically for now.     3. History of syncope/bradycardia  The patient has a history of syncope/bradycardia.  He denies having recurrent syncopal episodes since his last  visit.   ECG done today showed sinus bradycardia with a heart rate of 51 bpm.    He denies chest pain, palpitations or lightheadedness.   Echocardiogram done in April 2022 showed normal left ventricular systolic function with an ejection fraction of 60 to 65%, normal right ventricular systolic function, mild aortic valve stenosis with a mean pressure gradient of 12 mmHg and a dimensionless index of 0.53, trace to mild aortic valve regurgitation.  Recent lab works as noted in the HPI.   Lab works as noted below will be done in 6 months prior to his next visit.   Patient should follow general recommendations including staying well-hydrated, changing the positions slowly, wearing compression stockings as necessary, and routine exercise.  Follow up in 6 months and sooner if necessary.      4. Hypertension  The patient has a history of hypertension which appears controlled on exam today.  He should continue his current antihypertensive medications and monitor his blood pressure at home.      5. Dyslipidemia  Lipid panel done in November 2024 showed an LDL cholesterol 49 and triglycerides of 67 while on rosuvastatin 5 mg daily.   Lipid panel will be updated in 6 months.  Please see lifestyle recommendations below.            Orders:   CMP/lipid/magnesium/CBC in 6 months,   Follow-up in 6 months.    Lifestyle Recommendations  I recommend a whole-food plant-based diet, an eating pattern that encourages the consumption of unrefined plant foods (such as fruits, vegetables, tubers, whole grains, legumes, nuts and seeds) and discourages meats, dairy products, eggs and processed foods.     The AHA/ACC recommends that the patient consume a dietary pattern that emphasizes intake of vegetables, fruits, and whole grains; includes low-fat dairy products, poultry, fish, legumes, non-tropical vegetable oils, and nuts; and limits intake of sodium, sweets, sugar-sweetened beverages, and red meats.  Adapt this dietary pattern to  appropriate calorie requirements (a 500-750 kcal/day deficit to loose weight), personal and cultural food preferences, and nutrition therapy for other medical conditions (including diabetes).  Achieve this pattern by following plans such as the [place] Mediterranean, DASH dietary pattern, or AHA diet.     Engage in 2 hours and 30 minutes per week of moderate-intensity physical activity, or 1 hour and 15 minutes (75 minutes) per week of vigorous-intensity aerobic physical activity, or an equivalent combination of moderate and vigorous-intensity aerobic physical activity. Aerobic activity should be performed in episodes of at least 10 minutes preferably spread throughout the week.     Adhering to a heart healthy diet, regular exercise habits, avoidance of tobacco products, and maintenance of a healthy weight are crucial components of their heart disease risk reduction.     Any positive review of systems not specifically addressed in the office visit today should be evaluated and treated by the patients primary care physician or in an emergency department if necessary     Patient was notified that results from ordered tests will be called to the patient if it changes current management; it will otherwise be discussed at a future appointment and available on  [employer].     Thank you for allowing me to participate in the care of this patient.        This document was generated using the assistance of voice recognition software. If there are any errors of spelling, grammar, syntax, or meaning; please feel free to contact me directly for clarification.    Past Medical History:  He has a past medical history of Personal history of other diseases of the digestive system, Unspecified abdominal hernia without obstruction or gangrene, and Vitamin D deficiency, unspecified.    Past Surgical History:  He has a past surgical history that includes Eye surgery (08/15/2016); Rotator cuff repair (08/15/2016); and Other surgical history  "(01/19/2020).      Social History:  He reports that he has never smoked. He has never used smokeless tobacco. He reports that he does not drink alcohol and does not use drugs.    Family History:  Family History  Problem Relation Name Age of Onset    Heart disease Father      Lung cancer Maternal Grandmother          Allergies:  Patient has no known allergies.    Outpatient Medications:  Current Outpatient Medications   Medication Instructions    aspirin 81 mg EC tablet 1 tablet, Daily    cholecalciferol (Vitamin D-3) 25 MCG (1000 UT) capsule Take by mouth.    cyanocobalamin, vitamin B-12, (Vitamin B-12) 1,000 mcg tablet extended release 1 tablet, Daily    ferrous sulfate, 325 mg ferrous sulfate, tablet 1 tablet, Daily    lisinopril 10 mg, oral, 2 times daily    rosuvastatin (Crestor) 5 mg tablet Take one tablet by mouth in the evening.    timolol (Timoptic) 0.5 % ophthalmic solution Administer into affected eye(s).        ROS:  A 14 point review of systems was done and is negative other than as stated in HPI    Vitals:      5/26/2023     3:26 PM 11/16/2023     9:03 AM 3/7/2024     2:01 PM 5/24/2024    10:13 AM 9/13/2024    11:07 AM 11/18/2024     1:44 PM 2/20/2025     8:28 AM   Vitals   Systolic 150 120 142 160 140 144 139   Diastolic 74 64 74 88 86 68 79   BP Location     Left arm Left arm Left arm   Heart Rate  60 51 56 77 65 51   Resp   16       Height   1.715 m (5' 7.5\") 1.715 m (5' 7.5\") 1.74 m (5' 8.5\") 1.74 m (5' 8.5\") 1.702 m (5' 7\")   Weight (lb) 194.8 193 197.8 186 179 179 189   BMI 30.06 kg/m2 29.78 kg/m2 30.52 kg/m2 28.7 kg/m2 26.82 kg/m2 26.82 kg/m2 29.6 kg/m2   BSA (m2) 2.05 m2 2.04 m2 2.07 m2 2 m2 1.98 m2 1.98 m2 2.01 m2        Physical Exam:   Constitutional: Cooperative, in no acute distress, alert, appears stated age.   Skin: Skin color, texture, turgor normal. No rashes or lesions.   Head: Normocephalic. No masses, lesions, tenderness or abnormalities   Eyes: Extraocular movements are grossly " intact.   Mouth and throat: Mucous membranes moist   Neck: Neck supple, no carotid bruits, no JVD   Respiratory: Lungs clear to auscultation, no wheezing or rhonchi, no use of accessory muscles   Chest wall: No scars, normal excursion with respiration   Cardiovascular: Bradycardic, regular rhythm, +murmur  Gastrointestinal: Abdomen soft, nontender. Bowel sounds normal. Obese.  Musculoskeletal: Strength equal in upper extremities   Extremities: Trace pitting edema   Neurologic: Sensation grossly intact, alert and oriented x3        Intake/Output:   No intake/output data recorded.    Outpatient Medications  Current Outpatient Medications on File Prior to Visit   Medication Sig Dispense Refill    aspirin 81 mg EC tablet Take 1 tablet (81 mg) by mouth once daily.      cholecalciferol (Vitamin D-3) 25 MCG (1000 UT) capsule Take by mouth.      cyanocobalamin, vitamin B-12, (Vitamin B-12) 1,000 mcg tablet extended release Take 1 tablet (1,000 mcg) by mouth once daily.      ferrous sulfate, 325 mg ferrous sulfate, tablet Take 1 tablet (325 mg) by mouth once daily.      lisinopril 10 mg tablet Take 1 tablet (10 mg) by mouth 2 times a day. 180 tablet 1    rosuvastatin (Crestor) 5 mg tablet Take one tablet by mouth in the evening. 90 tablet 3    timolol (Timoptic) 0.5 % ophthalmic solution Administer into affected eye(s).       No current facility-administered medications on file prior to visit.       Labs: (past 26 weeks)  Recent Results (from the past 26 weeks)   CBC    Collection Time: 11/19/24  8:06 AM   Result Value Ref Range    WBC 6.1 4.4 - 11.3 x10*3/uL    nRBC 0.0 0.0 - 0.0 /100 WBCs    RBC 5.10 4.50 - 5.90 x10*6/uL    Hemoglobin 15.1 13.5 - 17.5 g/dL    Hematocrit 46.4 41.0 - 52.0 %    MCV 91 80 - 100 fL    MCH 29.6 26.0 - 34.0 pg    MCHC 32.5 32.0 - 36.0 g/dL    RDW 13.4 11.5 - 14.5 %    Platelets 214 150 - 450 x10*3/uL   Comprehensive Metabolic Panel    Collection Time: 11/19/24  8:06 AM   Result Value Ref Range     Glucose 84 74 - 99 mg/dL    Sodium 139 136 - 145 mmol/L    Potassium 4.3 3.5 - 5.3 mmol/L    Chloride 108 (H) 98 - 107 mmol/L    Bicarbonate 24 21 - 32 mmol/L    Anion Gap 11 10 - 20 mmol/L    Urea Nitrogen 28 (H) 6 - 23 mg/dL    Creatinine 0.97 0.50 - 1.30 mg/dL    eGFR 79 >60 mL/min/1.73m*2    Calcium 8.8 8.6 - 10.3 mg/dL    Albumin 3.7 3.4 - 5.0 g/dL    Alkaline Phosphatase 61 33 - 136 U/L    Total Protein 6.1 (L) 6.4 - 8.2 g/dL    AST 14 9 - 39 U/L    Bilirubin, Total 0.8 0.0 - 1.2 mg/dL    ALT 9 (L) 10 - 52 U/L   Lipid Panel    Collection Time: 11/19/24  8:06 AM   Result Value Ref Range    Cholesterol 107 0 - 199 mg/dL    HDL-Cholesterol 44.2 mg/dL    Cholesterol/HDL Ratio 2.4     LDL Calculated 49 <=99 mg/dL    VLDL 13 0 - 40 mg/dL    Triglycerides 67 0 - 149 mg/dL    Non HDL Cholesterol 63 0 - 149 mg/dL   Magnesium    Collection Time: 11/19/24  8:06 AM   Result Value Ref Range    Magnesium 2.17 1.60 - 2.40 mg/dL   Hemoglobin A1C    Collection Time: 02/20/25  9:07 AM   Result Value Ref Range    HEMOGLOBIN A1c 6.4 (H) <5.7 % of total Hgb    eAG (mg/dL) 137 mg/dL    eAG (mmol/L) 7.6 mmol/L   CBC and Auto Differential    Collection Time: 02/20/25  9:07 AM   Result Value Ref Range    WHITE BLOOD CELL COUNT 6.1 3.8 - 10.8 Thousand/uL    RED BLOOD CELL COUNT 5.33 4.20 - 5.80 Million/uL    HEMOGLOBIN 15.9 13.2 - 17.1 g/dL    HEMATOCRIT 48.4 38.5 - 50.0 %    MCV 90.8 80.0 - 100.0 fL    MCH 29.8 27.0 - 33.0 pg    MCHC 32.9 32.0 - 36.0 g/dL    RDW 12.6 11.0 - 15.0 %    PLATELET COUNT 198 140 - 400 Thousand/uL    MPV 10.8 7.5 - 12.5 fL    ABSOLUTE NEUTROPHILS 3,617 1,500 - 7,800 cells/uL    ABSOLUTE LYMPHOCYTES 1,586 850 - 3,900 cells/uL    ABSOLUTE MONOCYTES 641 200 - 950 cells/uL    ABSOLUTE EOSINOPHILS 183 15 - 500 cells/uL    ABSOLUTE BASOPHILS 73 0 - 200 cells/uL    NEUTROPHILS 59.3 %    LYMPHOCYTES 26.0 %    MONOCYTES 10.5 %    EOSINOPHILS 3.0 %    BASOPHILS 1.2 %   Comprehensive Metabolic Panel    Collection Time:  02/20/25  9:07 AM   Result Value Ref Range    GLUCOSE 92 65 - 99 mg/dL    UREA NITROGEN (BUN) 26 (H) 7 - 25 mg/dL    CREATININE 0.99 0.70 - 1.22 mg/dL    EGFR 77 > OR = 60 mL/min/1.73m2    SODIUM 140 135 - 146 mmol/L    POTASSIUM 5.1 3.5 - 5.3 mmol/L    CHLORIDE 105 98 - 110 mmol/L    CARBON DIOXIDE 25 20 - 32 mmol/L    ELECTROLYTE BALANCE 10 7 - 17 mmol/L (calc)    CALCIUM 9.2 8.6 - 10.3 mg/dL    PROTEIN, TOTAL 6.6 6.1 - 8.1 g/dL    ALBUMIN 4.2 3.6 - 5.1 g/dL    BILIRUBIN, TOTAL 0.8 0.2 - 1.2 mg/dL    ALKALINE PHOSPHATASE 56 35 - 144 U/L    AST 16 10 - 35 U/L    ALT 12 9 - 46 U/L       ECG  Encounter Date: 11/18/24   ECG 12 Lead    Narrative    Sinus rhythm, heart rate 65 bpm.       Echocardiogram  No results found for this or any previous visit from the past 1095 days.      CV Studies:  EKG:  Encounter Date: 11/18/24   ECG 12 Lead    Narrative    Sinus rhythm, heart rate 65 bpm.     Echocardiogram:   [place]ocardi[place] [address]  Phone [phone] Fax [phone]    TRANSTHORACIC ECHOCARDIOGRAM REPORT      Patient Name:     ROGELIO Villeda Physician:  [id number] Piper Randle MD  Study Date:       4/21/2022          Referring           [id number] MÓNICA GRAYSON  Physician:  MRN/PID:          [id number]           PCP:  Accession/Order#: [id number]          Department  Location:  YOB: 1945          Fellow:  Gender:           M                  Nurse:  Admit Date:       4/19/2022          Sonographer:        Diana Byrne [facility]  Admission Status: Inpatient -        Additional Staff:  Routine  Height:           170.18 cm          CC Report to:  Weight:           84.82 kg           Study Type:         Echocardiogram  BSA:              1.97 m2  Blood Pressure: 106 /55 mmHg    Diagnosis/ICD: R94.31-Abnormal electrocardiogram [ECG] [EKG]  Indication:    Abnormal EKG  Procedure/CPT: Echo Complete w Full Doppler-[id number]  Study Detail:  The following Echo studies were performed: 2D, M-Mode, Doppler and  color flow.      PHYSICIAN INTERPRETATION:  Left Ventricle: The left ventricular systolic function is normal, with an estimated ejection fraction of 60-65%. There are no regional wall motion abnormalities. The left ventricular cavity size is normal. Spectral Doppler shows a normal pattern of left ventricular diastolic filling.  Left Atrium: The left atrium is moderately dilated.  Right Ventricle: The right ventricle is normal in size. There is normal right ventricular global systolic function.  Right Atrium: The right atrium is normal in size.  Aortic Valve: The aortic valve is trileaflet. There is evidence of mild aortic valve stenosis.  The aortic valve dimensionless index is 0.53. There is trace to mild aortic valve regurgitation. The peak instantaneous gradient of the aortic valve is 23.6 mmHg. The mean gradient of the aortic valve is 12.0 mmHg.  Mitral Valve: The mitral valve is normal in structure. There is no evidence of mitral valve regurgitation.  Tricuspid Valve: The tricuspid valve is structurally normal. No evidence of tricuspid regurgitation.  Pulmonic Valve: The pulmonic valve is structurally normal. There is physiologic pulmonic valve regurgitation.  Pericardium: There is no pericardial effusion noted.  Aorta: The aortic root is normal.      CONCLUSIONS:  1. The left ventricular systolic function is normal with a 60-65% estimated ejection fraction.  2. The left atrium is moderately dilated.  3. Mild aortic valve stenosis.    QUANTITATIVE DATA SUMMARY:  2D MEASUREMENTS:  Normal Ranges:  LAs:           4.60 cm   (2.7-4.0cm)  IVSd:          1.10 cm   (0.6-1.1cm)  LVPWd:         1.20 cm   (0.6-1.1cm)  LVIDd:         4.45 cm   (3.9-5.9cm)  LVIDs:         2.47 cm  LV Mass Index: 96.5 g/m2  LV % FS        44.5 %    LA VOLUME:  Normal Ranges:  LA Vol A4C:        51.7 ml    (22+/-6mL/m2)  LA Vol A2C:        58.3 ml  LA Vol BP:         59.8  ml  LA Vol Index A4C:  26.3ml/m2  LA Vol Index A2C:  29.7 ml/m2  LA Vol Index BP:   30.4 ml/m2  LA Area A4C:       20.0 cm2  LA Area A2C:       19.5 cm2  LA Major Axis A4C: 6.6 cm  LA Major Axis A2C: 5.5 cm  LA Volume Index:   29.0 ml/m2    RA VOLUME BY A/L METHOD:  Normal Ranges:  RA Area A4C: 13.3 cm2    M-MODE MEASUREMENTS:  Normal Ranges:  Ao Root: 2.66 cm (2.0-3.7cm)    LV SYSTOLIC FUNCTION BY 2D PLANIMETRY (MOD):  Normal Ranges:  EF-A4C View: 65.4 % (>55%)  EF-A2C View: 60.2 %  EF-Biplane:  65.1 %    LV DIASTOLIC FUNCTION:  Normal Ranges:  MV Peak E:    0.77 m/s (0.7-1.2 m/s)  MV Peak A:    1.07 m/s (0.42-0.7 m/s)  E/A Ratio:    0.72     (1.0-2.2)  MV e'         0.10 m/s (>8.0)  MV lateral e' 0.12 m/s  MV medial e'  0.09 m/s  E/e' Ratio:   7.34     (<8.0)    MITRAL VALVE:  Normal Ranges:  MV DT: 246 msec (150-240msec)    AORTIC VALVE:  Normal Ranges:  AoV Vmax:                2.43 m/s  (<1.7m/s)  AoV Peak P.6 mmHg (<20mmHg)  AoV Mean P.0 mmHg (1.7-11.5mmHg)  LVOT Max Missael:            1.12 m/s  (<1.1m/s)  AoV VTI:                 49.30 cm  (18-25cm)  LVOT VTI:                26.00 cm  AoV Dimensionless Index: 0.53    RIGHT VENTRICLE:  RV 1   3.43 cm  RV 2   3.34 cm  RV 3   7.04 cm  TAPSE: 31.6 mm  RV s'  0.22 m/s    TRICUSPID VALVE/RVSP:  Normal Ranges:  TV E Vmax: 0.42 m/s (0.3-0.7m/s)  TV A Vmax: 0.43 m/s  IVC Diam:  1.63 cm      [id number] Piper Randle MD  Electronically signed on 2022 at 6:04:32 PM         Final     Stress Testing IMGRESULT([id number]:1:): No results found for this or any previous visit from the past  days.    Cardiac Catheterization: No results found for this or any previous visit from the past  days.  No results found for this or any previous visit from the past 3650 days.     Cardiac Scoring: No results found for this or any previous visit from the past  days.    AAA : No results found for this or any previous visit from the past   days.    OTHER: No results found for this or any previous visit from the past 1825 days.    LAST IMAGING RESULTS  ECG 12 Lead  Sinus rhythm, heart rate 65 bpm.      Problem List Items Addressed This Visit       Aortic valve stenosis    ASHD (arteriosclerotic heart disease) - Primary    Bradycardia    Hypertension (Chronic)    Hyperlipidemia (Chronic)    Syncope and collapse          Janes Gregory DO, FACC, FACOI

## 2025-05-08 ENCOUNTER — TELEPHONE (OUTPATIENT)
Dept: CARDIOLOGY | Facility: HOSPITAL | Age: 80
End: 2025-05-08
Payer: MEDICARE

## 2025-05-08 NOTE — TELEPHONE ENCOUNTER
5/8/25  1009  Patient sent in picture of blood clot her had while urinating.    Patient asked if he needs a medication adjustment for his Crestor.    Showed picture and request to Dr. Gregory who reported no medication change needed as hematuria not caused by Crestor, but patient should see a urologist.    Called and informed patient of above and offered referral to urology.    Patient verbalized  understanding but declined referral and reported he has only taking his Crestor moderately.  He reported he will tall with Dr. Gregory and his PCP over referral; nurse did suggest he continue to take his Crestor on a regular basis.  
27-Feb-2019 13:10

## 2025-05-13 LAB
ANION GAP SERPL CALCULATED.4IONS-SCNC: 15 MMOL/L (CALC) (ref 7–17)
BUN SERPL-MCNC: 29 MG/DL (ref 7–25)
BUN/CREAT SERPL: 27 (CALC) (ref 6–22)
CALCIUM SERPL-MCNC: 8.9 MG/DL (ref 8.6–10.3)
CHLORIDE SERPL-SCNC: 108 MMOL/L (ref 98–110)
CO2 SERPL-SCNC: 19 MMOL/L (ref 20–32)
CREAT SERPL-MCNC: 1.09 MG/DL (ref 0.7–1.22)
EGFRCR SERPLBLD CKD-EPI 2021: 69 ML/MIN/1.73M2
GLUCOSE SERPL-MCNC: ABNORMAL MG/DL
MAGNESIUM SERPL-MCNC: 2.8 MG/DL (ref 1.5–2.5)
POTASSIUM SERPL-SCNC: 4.6 MMOL/L (ref 3.5–5.3)
SODIUM SERPL-SCNC: 142 MMOL/L (ref 135–146)

## 2025-05-23 ENCOUNTER — OFFICE VISIT (OUTPATIENT)
Dept: CARDIOLOGY | Facility: HOSPITAL | Age: 80
End: 2025-05-23
Payer: MEDICARE

## 2025-05-23 VITALS
WEIGHT: 189 LBS | SYSTOLIC BLOOD PRESSURE: 122 MMHG | DIASTOLIC BLOOD PRESSURE: 80 MMHG | BODY MASS INDEX: 29.66 KG/M2 | HEART RATE: 51 BPM | HEIGHT: 67 IN

## 2025-05-23 DIAGNOSIS — R55 SYNCOPE AND COLLAPSE: ICD-10-CM

## 2025-05-23 DIAGNOSIS — E66.9 OBESITY (BMI 30-39.9): ICD-10-CM

## 2025-05-23 DIAGNOSIS — I35.0 NONRHEUMATIC AORTIC VALVE STENOSIS: ICD-10-CM

## 2025-05-23 DIAGNOSIS — I35.1 NONRHEUMATIC AORTIC VALVE INSUFFICIENCY: ICD-10-CM

## 2025-05-23 DIAGNOSIS — I25.10 ASHD (ARTERIOSCLEROTIC HEART DISEASE): Primary | ICD-10-CM

## 2025-05-23 DIAGNOSIS — E78.00 PURE HYPERCHOLESTEROLEMIA: Chronic | ICD-10-CM

## 2025-05-23 DIAGNOSIS — E78.5 HYPERLIPIDEMIA, UNSPECIFIED HYPERLIPIDEMIA TYPE: ICD-10-CM

## 2025-05-23 DIAGNOSIS — I10 PRIMARY HYPERTENSION: Chronic | ICD-10-CM

## 2025-05-23 DIAGNOSIS — R00.1 BRADYCARDIA: ICD-10-CM

## 2025-05-23 LAB
ATRIAL RATE: 51 BPM
P AXIS: 39 DEGREES
P OFFSET: 193 MS
P ONSET: 140 MS
PR INTERVAL: 166 MS
Q ONSET: 223 MS
QRS COUNT: 9 BEATS
QRS DURATION: 86 MS
QT INTERVAL: 422 MS
QTC CALCULATION(BAZETT): 388 MS
QTC FREDERICIA: 400 MS
R AXIS: 54 DEGREES
T AXIS: 3 DEGREES
T OFFSET: 434 MS
VENTRICULAR RATE: 51 BPM

## 2025-05-23 PROCEDURE — 1036F TOBACCO NON-USER: CPT | Performed by: INTERNAL MEDICINE

## 2025-05-23 PROCEDURE — 93005 ELECTROCARDIOGRAM TRACING: CPT | Performed by: INTERNAL MEDICINE

## 2025-05-23 PROCEDURE — 93010 ELECTROCARDIOGRAM REPORT: CPT | Performed by: INTERNAL MEDICINE

## 2025-05-23 PROCEDURE — 3079F DIAST BP 80-89 MM HG: CPT | Performed by: INTERNAL MEDICINE

## 2025-05-23 PROCEDURE — 3074F SYST BP LT 130 MM HG: CPT | Performed by: INTERNAL MEDICINE

## 2025-05-23 PROCEDURE — 99214 OFFICE O/P EST MOD 30 MIN: CPT | Performed by: INTERNAL MEDICINE

## 2025-05-23 PROCEDURE — 99214 OFFICE O/P EST MOD 30 MIN: CPT | Mod: 25 | Performed by: INTERNAL MEDICINE

## 2025-06-10 ENCOUNTER — TELEPHONE (OUTPATIENT)
Dept: CARDIOLOGY | Facility: HOSPITAL | Age: 80
End: 2025-06-10
Payer: MEDICARE

## 2025-06-10 DIAGNOSIS — I10 PRIMARY HYPERTENSION: Chronic | ICD-10-CM

## 2025-06-10 NOTE — TELEPHONE ENCOUNTER
[facility] JOANN Hinkle - [facility] AT [facility] [place] [facility]        Medication : Lisinopril       Medication Refill

## 2025-06-10 NOTE — TELEPHONE ENCOUNTER
6/10/25  1756  Called patient to inquire if he knew if he had refills on his lisinopril. Patient reported he usually let the doctor's office handle that and requested office to check on this.    Called [facility]; was disconnected after being on hold for 10 minutes. Will try on 5/11/25.

## 2025-06-11 RX ORDER — LISINOPRIL 10 MG/1
10 TABLET ORAL 2 TIMES DAILY
Qty: 180 TABLET | Refills: 1 | Status: SHIPPED | OUTPATIENT
Start: 2025-06-11 | End: 2025-12-08

## 2025-08-25 ENCOUNTER — TELEPHONE (OUTPATIENT)
Dept: CARDIOLOGY | Facility: HOSPITAL | Age: 80
End: 2025-08-25
Payer: MEDICARE

## 2026-02-20 ENCOUNTER — APPOINTMENT (OUTPATIENT)
Dept: PRIMARY CARE | Facility: CLINIC | Age: 81
End: 2026-02-20
Payer: MEDICARE